# Patient Record
Sex: MALE | Race: WHITE | Employment: OTHER | URBAN - METROPOLITAN AREA
[De-identification: names, ages, dates, MRNs, and addresses within clinical notes are randomized per-mention and may not be internally consistent; named-entity substitution may affect disease eponyms.]

---

## 2017-02-21 ENCOUNTER — TELEPHONE (OUTPATIENT)
Dept: INTERNAL MEDICINE CLINIC | Facility: CLINIC | Age: 77
End: 2017-02-21

## 2017-02-21 DIAGNOSIS — E78.00 HYPERCHOLESTEROLEMIA: Primary | ICD-10-CM

## 2017-02-21 DIAGNOSIS — G60.9 IDIOPATHIC PERIPHERAL NEUROPATHY: Primary | ICD-10-CM

## 2017-02-21 NOTE — TELEPHONE ENCOUNTER
Pt came to to the office to request a referral for a stress test that Dr. Candie Marinelli ordered today. ICD 10 - I25.10, R94.30.             Tasked to Nursing

## 2017-02-21 NOTE — TELEPHONE ENCOUNTER
Pt called today from Dr Jeremy Hewitt office. He was unaware that [facility] at [facility]. Spoke to managed care and they will process referral from Dr DINERO once it has been entered in [facility] [address]. Please kalli it as urgent to managed care.   To bharat

## 2017-02-22 NOTE — TELEPHONE ENCOUNTER
Discussed with patient. He saw Dr. Tena Nguyen yesterday and was given order for of his stress test.  Patient was unclear as to what type of stress test he was to have. I called Dr. Sarah Rae office and spoke to Neto Shell.   She indica

## 2017-02-24 RX ORDER — LORAZEPAM 0.5 MG/1
TABLET ORAL
Qty: 90 TABLET | Refills: 1 | OUTPATIENT
Start: 2017-02-24 | End: 2017-08-11

## 2017-04-24 ENCOUNTER — HOSPITAL ENCOUNTER (OUTPATIENT)
Dept: CV DIAGNOSTICS | Facility: HOSPITAL | Age: 77
Discharge: HOME | End: 2017-04-24
Attending: INTERNAL MEDICINE
Payer: MEDICARE

## 2017-04-24 DIAGNOSIS — R94.30 CARDIOVASCULAR FUNCTION STUDY, ABNORMAL: ICD-10-CM

## 2017-04-24 DIAGNOSIS — I25.10 ASHD (ARTERIOSCLEROTIC HEART DISEASE): ICD-10-CM

## 2017-04-24 PROCEDURE — 93018 CV STRESS TEST I&R ONLY: CPT | Performed by: INTERNAL MEDICINE

## 2017-04-24 PROCEDURE — 93016 CV STRESS TEST SUPVJ ONLY: CPT | Performed by: INTERNAL MEDICINE

## 2017-04-24 PROCEDURE — 93017 CV STRESS TEST TRACING ONLY: CPT

## 2017-06-14 ENCOUNTER — APPOINTMENT (OUTPATIENT)
Dept: LAB | Facility: HOSPITAL | Age: 77
End: 2017-06-14
Attending: INTERNAL MEDICINE
Payer: MEDICARE

## 2017-06-14 DIAGNOSIS — E78.00 HYPERCHOLESTEREMIA: ICD-10-CM

## 2017-06-14 DIAGNOSIS — R74.8 ELEVATED CPK: ICD-10-CM

## 2017-06-14 DIAGNOSIS — R73.01 ABNORMAL FASTING GLUCOSE: ICD-10-CM

## 2017-06-14 PROCEDURE — 84460 ALANINE AMINO (ALT) (SGPT): CPT

## 2017-06-14 PROCEDURE — 82947 ASSAY GLUCOSE BLOOD QUANT: CPT

## 2017-06-14 PROCEDURE — 83036 HEMOGLOBIN GLYCOSYLATED A1C: CPT

## 2017-06-14 PROCEDURE — 82550 ASSAY OF CK (CPK): CPT

## 2017-06-14 PROCEDURE — 84450 TRANSFERASE (AST) (SGOT): CPT

## 2017-06-14 PROCEDURE — 80061 LIPID PANEL: CPT

## 2017-06-14 PROCEDURE — 36415 COLL VENOUS BLD VENIPUNCTURE: CPT

## 2017-06-19 PROBLEM — N52.01 ERECTILE DYSFUNCTION DUE TO ARTERIAL INSUFFICIENCY: Status: ACTIVE | Noted: 2017-06-19

## 2017-06-19 PROBLEM — I70.0 ATHEROSCLEROSIS OF AORTA (HCC): Status: ACTIVE | Noted: 2017-06-19

## 2017-06-19 PROBLEM — R35.1 NOCTURIA: Status: ACTIVE | Noted: 2017-06-19

## 2017-06-21 ENCOUNTER — OFFICE VISIT (OUTPATIENT)
Dept: INTERNAL MEDICINE CLINIC | Facility: CLINIC | Age: 77
End: 2017-06-21

## 2017-06-21 VITALS
DIASTOLIC BLOOD PRESSURE: 70 MMHG | SYSTOLIC BLOOD PRESSURE: 110 MMHG | OXYGEN SATURATION: 97 % | WEIGHT: 181 LBS | BODY MASS INDEX: 24.79 KG/M2 | TEMPERATURE: 98 F | HEIGHT: 71.5 IN | HEART RATE: 80 BPM

## 2017-06-21 DIAGNOSIS — Z00.00 ANNUAL PHYSICAL EXAM: ICD-10-CM

## 2017-06-21 DIAGNOSIS — R97.20 ELEVATED PSA: ICD-10-CM

## 2017-06-21 DIAGNOSIS — M48.061 SPINAL STENOSIS OF LUMBAR REGION: ICD-10-CM

## 2017-06-21 DIAGNOSIS — M15.9 PRIMARY OSTEOARTHRITIS INVOLVING MULTIPLE JOINTS: ICD-10-CM

## 2017-06-21 DIAGNOSIS — R74.8 ELEVATED CPK: ICD-10-CM

## 2017-06-21 DIAGNOSIS — K57.30 DIVERTICULOSIS OF LARGE INTESTINE WITHOUT HEMORRHAGE: ICD-10-CM

## 2017-06-21 DIAGNOSIS — R73.01 ABNORMAL FASTING GLUCOSE: ICD-10-CM

## 2017-06-21 DIAGNOSIS — G62.9 PERIPHERAL POLYNEUROPATHY: ICD-10-CM

## 2017-06-21 DIAGNOSIS — I25.10 ASHD (ARTERIOSCLEROTIC HEART DISEASE): Primary | ICD-10-CM

## 2017-06-21 DIAGNOSIS — N40.0 BENIGN NON-NODULAR PROSTATIC HYPERPLASIA WITHOUT LOWER URINARY TRACT SYMPTOMS: ICD-10-CM

## 2017-06-21 DIAGNOSIS — E78.00 HYPERCHOLESTEREMIA: ICD-10-CM

## 2017-06-21 DIAGNOSIS — Z86.010 HX OF COLONIC POLYP: ICD-10-CM

## 2017-06-21 DIAGNOSIS — I70.0 ATHEROSCLEROSIS OF AORTA (HCC): ICD-10-CM

## 2017-06-21 DIAGNOSIS — R53.83 FATIGUE, UNSPECIFIED TYPE: ICD-10-CM

## 2017-06-21 PROBLEM — N52.01 ERECTILE DYSFUNCTION DUE TO ARTERIAL INSUFFICIENCY: Status: RESOLVED | Noted: 2017-06-19 | Resolved: 2017-06-21

## 2017-06-21 PROBLEM — R35.1 NOCTURIA: Status: RESOLVED | Noted: 2017-06-19 | Resolved: 2017-06-21

## 2017-06-21 PROCEDURE — 99214 OFFICE O/P EST MOD 30 MIN: CPT | Performed by: INTERNAL MEDICINE

## 2017-06-21 PROCEDURE — 90732 PPSV23 VACC 2 YRS+ SUBQ/IM: CPT | Performed by: INTERNAL MEDICINE

## 2017-06-21 PROCEDURE — G0463 HOSPITAL OUTPT CLINIC VISIT: HCPCS | Performed by: INTERNAL MEDICINE

## 2017-06-21 PROCEDURE — G0009 ADMIN PNEUMOCOCCAL VACCINE: HCPCS | Performed by: INTERNAL MEDICINE

## 2017-06-21 NOTE — PROGRESS NOTES
Candy Guerra is a 78year old male. Patient presents with:  Physical: annual phys medicare  Ashd  Hyperlipidemia  Arthritis    HPI:   Patient presents with:  Physical: annual phys medicare  Ashd  Hyperlipidemia  Arthritis    Pt feels well.   Pt continues to cardiovascular function 2001     elevated calcium score    • Allergy    • Spinal stenosis 2005   • Colon, diverticulosis    • Colon polyp    • Back injury 2007   • ASHD (arteriosclerotic heart disease)       Social History:    Smoking Status: Never Smoker Abnormal fasting glucose  Stable. CPM.  Patient's recent . His hemoglobin A1c was 6.0.  CPM.  Patient continues to watch his diet and exercise. 5. Primary osteoarthritis involving multiple joints  Stable.   CPM.    6. Spinal stenosis of lumbar r

## 2017-06-21 NOTE — PATIENT INSTRUCTIONS
1.  Patient is to continue his current diet, medications and activity. 2.  Patient is to consider taking the medication as recommended by Dr. Joella Holstein office to assist in controlling his cholesterol reading.   3.  I will give the patient his Pneumovax vacc

## 2017-08-11 RX ORDER — LORAZEPAM 0.5 MG/1
TABLET ORAL
Qty: 90 TABLET | Refills: 1 | OUTPATIENT
Start: 2017-08-11 | End: 2018-02-07

## 2017-08-11 NOTE — TELEPHONE ENCOUNTER
Noted.  I have approved this patient's medication as requested with 1 additional refill. Please call this prescription into the patient's pharmacy as requested. I will route this to nursing.   Thank you!!

## 2017-09-11 RX ORDER — TAMSULOSIN HYDROCHLORIDE 0.4 MG/1
CAPSULE ORAL
Qty: 90 CAPSULE | Refills: 3 | Status: SHIPPED | OUTPATIENT
Start: 2017-09-11 | End: 2018-12-12

## 2017-11-30 ENCOUNTER — NURSE ONLY (OUTPATIENT)
Dept: INTERNAL MEDICINE CLINIC | Facility: CLINIC | Age: 77
End: 2017-11-30

## 2017-11-30 DIAGNOSIS — J02.9 SORE THROAT: Primary | ICD-10-CM

## 2017-11-30 PROCEDURE — 87880 STREP A ASSAY W/OPTIC: CPT | Performed by: INTERNAL MEDICINE

## 2017-11-30 NOTE — PROGRESS NOTES
Patient presented at the  as a walk in. Reported that his grandson was dx with strep throat. He has been having a sore throat for 3-4 days now. C/o sinus pressure. Denies fever. Per DR. Benz, do rapid strep and if negative send 2nd throat sw

## 2017-12-01 ENCOUNTER — TELEPHONE (OUTPATIENT)
Dept: INTERNAL MEDICINE CLINIC | Facility: CLINIC | Age: 77
End: 2017-12-01

## 2017-12-01 DIAGNOSIS — H40.9 GLAUCOMA OF BOTH EYES, UNSPECIFIED GLAUCOMA TYPE: Primary | ICD-10-CM

## 2017-12-01 DIAGNOSIS — H26.9 CATARACT OF BOTH EYES, UNSPECIFIED CATARACT TYPE: ICD-10-CM

## 2017-12-01 RX ORDER — AZITHROMYCIN 250 MG/1
TABLET, FILM COATED ORAL
Qty: 6 TABLET | Refills: 1 | Status: SHIPPED | OUTPATIENT
Start: 2017-12-01 | End: 2017-12-06

## 2017-12-01 NOTE — TELEPHONE ENCOUNTER
To Dr. Courtney Engle - see below - per Managed Care this can be done with dates of visits entered. See pended referral above - need dx. Review/revise.

## 2017-12-01 NOTE — TELEPHONE ENCOUNTER
Pt. Patti Lang in asking Dr. Comfort Pope To create referrals for his last 2 visits with Dr. Isabell Blount.  He saw him on 11-21-17 for an Eye field test, Glaucoma check and routine exam.  He also saw Dr. Isabell Blount today for a follow-up on eye medication change and cataracts.

## 2017-12-01 NOTE — TELEPHONE ENCOUNTER
Noted.  Discussed with patient. Patient has seen Dr. Eda Herndon for evaluation of his glaucoma and also cataracts. I have approved the patient's referral to see Dr. Eda Herndon for these conditions.   Thank you!!  Patient also stopped by the office to have a throa

## 2017-12-08 ENCOUNTER — LAB ENCOUNTER (OUTPATIENT)
Dept: LAB | Facility: HOSPITAL | Age: 77
End: 2017-12-08
Attending: INTERNAL MEDICINE
Payer: MEDICARE

## 2017-12-08 DIAGNOSIS — R73.01 ABNORMAL FASTING GLUCOSE: ICD-10-CM

## 2017-12-08 DIAGNOSIS — R74.8 ELEVATED CPK: ICD-10-CM

## 2017-12-08 DIAGNOSIS — R97.20 ELEVATED PSA: ICD-10-CM

## 2017-12-08 DIAGNOSIS — E78.00 HYPERCHOLESTEREMIA: ICD-10-CM

## 2017-12-08 DIAGNOSIS — R53.83 FATIGUE, UNSPECIFIED TYPE: ICD-10-CM

## 2017-12-08 DIAGNOSIS — Z00.00 ANNUAL PHYSICAL EXAM: ICD-10-CM

## 2017-12-08 PROCEDURE — 84153 ASSAY OF PSA TOTAL: CPT

## 2017-12-08 PROCEDURE — 85025 COMPLETE CBC W/AUTO DIFF WBC: CPT

## 2017-12-08 PROCEDURE — 80053 COMPREHEN METABOLIC PANEL: CPT

## 2017-12-08 PROCEDURE — 36415 COLL VENOUS BLD VENIPUNCTURE: CPT

## 2017-12-08 PROCEDURE — 84443 ASSAY THYROID STIM HORMONE: CPT

## 2017-12-08 PROCEDURE — 83036 HEMOGLOBIN GLYCOSYLATED A1C: CPT

## 2017-12-08 PROCEDURE — 82550 ASSAY OF CK (CPK): CPT

## 2017-12-08 PROCEDURE — 81001 URINALYSIS AUTO W/SCOPE: CPT

## 2017-12-08 PROCEDURE — 80061 LIPID PANEL: CPT

## 2017-12-15 ENCOUNTER — OFFICE VISIT (OUTPATIENT)
Dept: INTERNAL MEDICINE CLINIC | Facility: CLINIC | Age: 77
End: 2017-12-15

## 2017-12-15 VITALS
TEMPERATURE: 98 F | OXYGEN SATURATION: 97 % | HEIGHT: 71 IN | SYSTOLIC BLOOD PRESSURE: 100 MMHG | DIASTOLIC BLOOD PRESSURE: 66 MMHG | WEIGHT: 191 LBS | BODY MASS INDEX: 26.74 KG/M2 | HEART RATE: 72 BPM

## 2017-12-15 DIAGNOSIS — G62.9 PERIPHERAL POLYNEUROPATHY: ICD-10-CM

## 2017-12-15 DIAGNOSIS — I25.10 ASHD (ARTERIOSCLEROTIC HEART DISEASE): ICD-10-CM

## 2017-12-15 DIAGNOSIS — K57.30 DIVERTICULOSIS OF COLON: ICD-10-CM

## 2017-12-15 DIAGNOSIS — N40.0 BENIGN NON-NODULAR PROSTATIC HYPERPLASIA WITHOUT LOWER URINARY TRACT SYMPTOMS: ICD-10-CM

## 2017-12-15 DIAGNOSIS — I70.0 ATHEROSCLEROSIS OF AORTA (HCC): ICD-10-CM

## 2017-12-15 DIAGNOSIS — Z86.010 HX OF COLONIC POLYP: ICD-10-CM

## 2017-12-15 DIAGNOSIS — R53.83 FATIGUE, UNSPECIFIED TYPE: ICD-10-CM

## 2017-12-15 DIAGNOSIS — M15.9 PRIMARY OSTEOARTHRITIS INVOLVING MULTIPLE JOINTS: ICD-10-CM

## 2017-12-15 DIAGNOSIS — M48.061 SPINAL STENOSIS OF LUMBAR REGION WITHOUT NEUROGENIC CLAUDICATION: ICD-10-CM

## 2017-12-15 DIAGNOSIS — R74.8 ELEVATED CPK: ICD-10-CM

## 2017-12-15 DIAGNOSIS — E78.00 HYPERCHOLESTEREMIA: ICD-10-CM

## 2017-12-15 DIAGNOSIS — R73.01 ABNORMAL FASTING GLUCOSE: ICD-10-CM

## 2017-12-15 DIAGNOSIS — Z00.00 ANNUAL PHYSICAL EXAM: Primary | ICD-10-CM

## 2017-12-15 DIAGNOSIS — R97.20 ELEVATED PSA: ICD-10-CM

## 2017-12-15 PROCEDURE — 82272 OCCULT BLD FECES 1-3 TESTS: CPT | Performed by: INTERNAL MEDICINE

## 2017-12-15 PROCEDURE — 99214 OFFICE O/P EST MOD 30 MIN: CPT | Performed by: INTERNAL MEDICINE

## 2017-12-15 PROCEDURE — G0463 HOSPITAL OUTPT CLINIC VISIT: HCPCS | Performed by: INTERNAL MEDICINE

## 2017-12-15 PROCEDURE — 96160 PT-FOCUSED HLTH RISK ASSMT: CPT | Performed by: INTERNAL MEDICINE

## 2017-12-15 NOTE — PATIENT INSTRUCTIONS
1.  Pt is to continue his current diet, medication and activity. 2.  I will see pt back in 6 months with blood tests which will include an FBS, Hb-A-1-C, Lipid Panel, AST, ALT and diagnostic PSA.   3.  Pt is to follow up with Dr Roger Driscoll regarding his kev

## 2017-12-15 NOTE — PROGRESS NOTES
Aaron Lynch is a 78year old male who presents for a complete physical exam.   HPI:   Mr. Jyotsna Powers is a 17-year-old white male who was seen by me in December 15, 2017 for his Medicare annual physical examination.   At the time the examination Mr. Demi hein daily as needed. Disp:  Rfl:    Ascorbic Acid (VITAMIN C) 1000 MG Oral Tab Take 1 tablet by mouth daily.  Disp:  Rfl:       Past Medical History:   Diagnosis Date   • Abnormal cardiovascular function 2001    elevated calcium score    • Allergy    • ASHD (ar auscultation  CARDIO: RRR, normal S1S2, no murmurs  GI:Abdomen is protuberant, BS are present, no masses or organomegaly  :Normal male, No hernia noted  RECTAL:  Stool is brown and is negative for Occult blood.   Prostate is 1+ enlarged with no palpable n well.  CPM.  Patient's recent . His hemoglobin A1c was 5.9.  CPM.  As above. - GLUCOSE, SERUM; Future  - HEMOGLOBIN A1C; Future    5. Primary osteoarthritis involving multiple joints  Stable. CPM.  Patient is doing well at this time.     6. Spin Yes    Have you had any immunizations at another office such as Influenza, Hepatitis B, Tetanus, or Pneumococcal?: No     Functional Ability     Bathing or Showering: Able without help    Toileting: Able without help    Dressing: Able without help    Eatin sentence and need to ask people to repeat themselves:  Sometimes   I especially have trouble understanding the speech of women and children:  Sometimes I have trouble understanding the speaker in a large room such as at a meeting or place of Yazidism:  No Colonoscopy Screen every 10 years Colonoscopy,10 Years due on 06/22/2022 Update Health Maintenance if applicable    Flex Sigmoidoscopy Screen every 5 years No results found for this or any previous visit. No flowsheet data found.      Fecal Occult Blood Alena Baca GLYCOHEMOGLOBIN (HgA1c) (L) (%)   Date Value   06/22/2016 5.7     Glycohemoglobin (HgA1c) (%)   Date Value   12/08/2017 5.9    No flowsheet data found.     Creat/alb ratio  Annually      LDL  Annually LDL Cholesterol (mg/dL)   Date Value   12/08/2017 132 (H

## 2018-02-08 RX ORDER — LORAZEPAM 0.5 MG/1
TABLET ORAL
Qty: 90 TABLET | Refills: 1 | OUTPATIENT
Start: 2018-02-08 | End: 2018-07-29

## 2018-02-08 NOTE — TELEPHONE ENCOUNTER
To MD:  The above refill request is for a controlled substance. Please indicate yes or no to refill 30 days supply plus one refill. If more refills are appropriate, please indicate quantity  To DR. DINERO

## 2018-02-09 NOTE — TELEPHONE ENCOUNTER
I have approved a refill of this patient's medication with 1 additional refill as requested. Please call in the refill to the patient's pharmacy as requested. I will route this to nursing.   Thank you!!

## 2018-03-08 RX ORDER — COLESEVELAM HYDROCHLORIDE 625 MG/1
TABLET, FILM COATED ORAL
Qty: 180 TABLET | Refills: 3 | Status: SHIPPED | OUTPATIENT
Start: 2018-03-08 | End: 2018-08-23

## 2018-06-04 ENCOUNTER — APPOINTMENT (OUTPATIENT)
Dept: LAB | Facility: HOSPITAL | Age: 78
End: 2018-06-04
Attending: INTERNAL MEDICINE
Payer: MEDICARE

## 2018-06-04 DIAGNOSIS — R97.20 ELEVATED PSA: ICD-10-CM

## 2018-06-04 DIAGNOSIS — R74.8 ELEVATED CPK: ICD-10-CM

## 2018-06-04 DIAGNOSIS — R73.01 ABNORMAL FASTING GLUCOSE: ICD-10-CM

## 2018-06-04 DIAGNOSIS — E78.00 HYPERCHOLESTEREMIA: ICD-10-CM

## 2018-06-04 PROCEDURE — 80061 LIPID PANEL: CPT

## 2018-06-04 PROCEDURE — 82947 ASSAY GLUCOSE BLOOD QUANT: CPT

## 2018-06-04 PROCEDURE — 82550 ASSAY OF CK (CPK): CPT

## 2018-06-04 PROCEDURE — 84153 ASSAY OF PSA TOTAL: CPT

## 2018-06-04 PROCEDURE — 36415 COLL VENOUS BLD VENIPUNCTURE: CPT

## 2018-06-04 PROCEDURE — 84450 TRANSFERASE (AST) (SGOT): CPT

## 2018-06-04 PROCEDURE — 84460 ALANINE AMINO (ALT) (SGPT): CPT

## 2018-06-04 PROCEDURE — 83036 HEMOGLOBIN GLYCOSYLATED A1C: CPT

## 2018-06-06 DIAGNOSIS — R97.20 ELEVATED PSA: ICD-10-CM

## 2018-06-06 DIAGNOSIS — R35.1 NOCTURIA: Primary | ICD-10-CM

## 2018-06-22 ENCOUNTER — OFFICE VISIT (OUTPATIENT)
Dept: INTERNAL MEDICINE CLINIC | Facility: CLINIC | Age: 78
End: 2018-06-22

## 2018-06-22 VITALS
DIASTOLIC BLOOD PRESSURE: 66 MMHG | OXYGEN SATURATION: 98 % | TEMPERATURE: 98 F | WEIGHT: 190.38 LBS | HEIGHT: 71 IN | SYSTOLIC BLOOD PRESSURE: 100 MMHG | HEART RATE: 80 BPM | BODY MASS INDEX: 26.65 KG/M2

## 2018-06-22 DIAGNOSIS — Z00.00 ANNUAL PHYSICAL EXAM: ICD-10-CM

## 2018-06-22 DIAGNOSIS — R97.20 ELEVATED PSA: ICD-10-CM

## 2018-06-22 DIAGNOSIS — K57.30 DIVERTICULOSIS OF COLON: ICD-10-CM

## 2018-06-22 DIAGNOSIS — M15.9 PRIMARY OSTEOARTHRITIS INVOLVING MULTIPLE JOINTS: ICD-10-CM

## 2018-06-22 DIAGNOSIS — Z86.010 HX OF COLONIC POLYP: ICD-10-CM

## 2018-06-22 DIAGNOSIS — R73.01 ABNORMAL FASTING GLUCOSE: ICD-10-CM

## 2018-06-22 DIAGNOSIS — I70.0 ATHEROSCLEROSIS OF AORTA (HCC): ICD-10-CM

## 2018-06-22 DIAGNOSIS — R53.83 FATIGUE, UNSPECIFIED TYPE: ICD-10-CM

## 2018-06-22 DIAGNOSIS — E78.00 HYPERCHOLESTEREMIA: ICD-10-CM

## 2018-06-22 DIAGNOSIS — G62.9 PERIPHERAL POLYNEUROPATHY: ICD-10-CM

## 2018-06-22 DIAGNOSIS — R74.8 ELEVATED CPK: ICD-10-CM

## 2018-06-22 DIAGNOSIS — I25.10 ASHD (ARTERIOSCLEROTIC HEART DISEASE): Primary | ICD-10-CM

## 2018-06-22 DIAGNOSIS — M48.061 SPINAL STENOSIS OF LUMBAR REGION WITHOUT NEUROGENIC CLAUDICATION: ICD-10-CM

## 2018-06-22 DIAGNOSIS — N40.0 BENIGN NON-NODULAR PROSTATIC HYPERPLASIA WITHOUT LOWER URINARY TRACT SYMPTOMS: ICD-10-CM

## 2018-06-22 PROCEDURE — 99214 OFFICE O/P EST MOD 30 MIN: CPT | Performed by: INTERNAL MEDICINE

## 2018-06-22 PROCEDURE — G0463 HOSPITAL OUTPT CLINIC VISIT: HCPCS | Performed by: INTERNAL MEDICINE

## 2018-06-22 RX ORDER — AZELASTINE HCL 205.5 UG/1
SPRAY NASAL
Qty: 30 ML | Refills: 3 | Status: SHIPPED | OUTPATIENT
Start: 2018-06-22

## 2018-06-22 NOTE — PATIENT INSTRUCTIONS
1.  Patient is to continue his current diet, medication and activity. 2.  I will plan see the patient back in 5-6 months with blood tests, urinalysis and EKG for his annual physical examination. 3.  The patient back sooner as necessary.

## 2018-06-22 NOTE — PROGRESS NOTES
Candy Guerra is a 78year old male. Patient presents with:  Checkup: 6 month  Ashd  Hyperlipidemia  Arthritis    HPI:   Patient presents with:  Checkup: 6 month  Ashd  Hyperlipidemia  Arthritis    Pt feels well. Pt is now taking Crestor twice a week.   Pt h injury 2007   • Colon polyp    • Colon, diverticulosis    • Spinal stenosis 2005      Social History:  Smoking status: Never Smoker                                                              Smokeless tobacco: Never Used                      Alcohol use: Patient's recent . His hemoglobin A1c was 5.9.    4. Primary osteoarthritis involving multiple joints  Stable. CPM.    5. Spinal stenosis of lumbar region without neurogenic claudication  Stable. CPM.    6. Peripheral polyneuropathy  Stable.   CPM

## 2018-07-06 ENCOUNTER — LAB ENCOUNTER (OUTPATIENT)
Dept: LAB | Facility: HOSPITAL | Age: 78
End: 2018-07-06
Attending: UROLOGY
Payer: MEDICARE

## 2018-07-06 DIAGNOSIS — R35.1 NOCTURIA: ICD-10-CM

## 2018-07-06 DIAGNOSIS — R97.20 ELEVATED PSA: ICD-10-CM

## 2018-07-06 LAB
BACTERIA UR QL AUTO: NEGATIVE /HPF
BILIRUB UR QL: NEGATIVE
CLARITY UR: CLEAR
COLOR UR: YELLOW
GLUCOSE UR-MCNC: NEGATIVE MG/DL
KETONES UR-MCNC: NEGATIVE MG/DL
LEUKOCYTE ESTERASE UR QL STRIP.AUTO: NEGATIVE
NITRITE UR QL STRIP.AUTO: NEGATIVE
PH UR: 6 [PH] (ref 5–8)
PROT UR-MCNC: NEGATIVE MG/DL
PSA FREE MFR SERPL: 37 %
PSA FREE SERPL-MCNC: 3.6 NG/ML
PSA SERPL-MCNC: 9.7 NG/ML (ref 0–4)
RBC #/AREA URNS AUTO: 0 /HPF
SP GR UR STRIP: 1.01 (ref 1–1.03)
UROBILINOGEN UR STRIP-ACNC: <2
VIT C UR-MCNC: NEGATIVE MG/DL
WBC #/AREA URNS AUTO: 0 /HPF

## 2018-07-06 PROCEDURE — 84154 ASSAY OF PSA FREE: CPT

## 2018-07-06 PROCEDURE — 81001 URINALYSIS AUTO W/SCOPE: CPT

## 2018-07-06 PROCEDURE — 84153 ASSAY OF PSA TOTAL: CPT

## 2018-07-06 PROCEDURE — 36415 COLL VENOUS BLD VENIPUNCTURE: CPT

## 2018-07-16 ENCOUNTER — OFFICE VISIT (OUTPATIENT)
Dept: SURGERY | Facility: CLINIC | Age: 78
End: 2018-07-16

## 2018-07-16 VITALS
DIASTOLIC BLOOD PRESSURE: 70 MMHG | WEIGHT: 190 LBS | TEMPERATURE: 98 F | HEART RATE: 88 BPM | HEIGHT: 71 IN | BODY MASS INDEX: 26.6 KG/M2 | RESPIRATION RATE: 16 BRPM | SYSTOLIC BLOOD PRESSURE: 125 MMHG

## 2018-07-16 DIAGNOSIS — R97.20 ELEVATED PSA: Primary | ICD-10-CM

## 2018-07-16 DIAGNOSIS — N48.6 PEYRONIE'S DISEASE: ICD-10-CM

## 2018-07-16 DIAGNOSIS — N52.01 ERECTILE DYSFUNCTION DUE TO ARTERIAL INSUFFICIENCY: ICD-10-CM

## 2018-07-16 DIAGNOSIS — N40.1 BENIGN NON-NODULAR PROSTATIC HYPERPLASIA WITH LOWER URINARY TRACT SYMPTOMS: ICD-10-CM

## 2018-07-16 DIAGNOSIS — R35.1 NOCTURIA: ICD-10-CM

## 2018-07-16 PROCEDURE — G0463 HOSPITAL OUTPT CLINIC VISIT: HCPCS | Performed by: UROLOGY

## 2018-07-16 PROCEDURE — 99215 OFFICE O/P EST HI 40 MIN: CPT | Performed by: UROLOGY

## 2018-07-16 PROCEDURE — 99212 OFFICE O/P EST SF 10 MIN: CPT | Performed by: UROLOGY

## 2018-07-16 RX ORDER — SILDENAFIL CITRATE 20 MG/1
TABLET ORAL
Qty: 25 TABLET | Refills: 5 | Status: SHIPPED | OUTPATIENT
Start: 2018-07-16 | End: 2019-06-26 | Stop reason: ALTCHOICE

## 2018-07-16 RX ORDER — FINASTERIDE 5 MG/1
5 TABLET, FILM COATED ORAL DAILY
Qty: 90 TABLET | Refills: 3 | Status: SHIPPED | OUTPATIENT
Start: 2018-07-16 | End: 2019-07-13

## 2018-07-16 RX ORDER — TAMSULOSIN HYDROCHLORIDE 0.4 MG/1
0.4 CAPSULE ORAL DAILY
Qty: 90 CAPSULE | Refills: 3 | Status: SHIPPED | OUTPATIENT
Start: 2018-07-16 | End: 2019-09-03

## 2018-07-16 NOTE — PATIENT INSTRUCTIONS
1.     Continue tamsulosin 0.4 mg daily    2. Start finasteride 5 mg daily--to inhibit size of the prostate    3. Visit in end of December 2018 or beginning of January 2019.     PSA total and free December 2018; please avoid all sexual stimulation for 5

## 2018-07-16 NOTE — PROGRESS NOTES
HPI:    Patient ID: Mikey Gonzalez is a 78year old male. HPI     1. Elevated PSA  Denies associated symptoms to suggest prostate cancer such as bone pain. The patient denies any weight loss or bone pain.    He feels that he does not have a prostate infec He denies any pain. He is still able to have intercourse with his wife. Review of previous records:   Visit note on 12/02/14:  Dr. Walter Elizabeth notes remark that patient will be referred to me for evaluation of elevated PSA.  Patient previously was seein 1/2 hour following the same meal each day Disp: 90 capsule Rfl: 3   Sildenafil Citrate 20 MG Oral Tab Take 2--3 tablets orally 1-2 hours before planned sexual activity daily.  Disp: 25 tablet Rfl: 5   Azelastine HCl 0.15 % Nasal Solution SPRAY ONE SPRAY IN ELECTROCARDIOGRAM, COMPLETE      Comment: Scanned to media tab - DOS 11-  2007: OTHER SURGICAL HISTORY      Comment: Laminoforaminotomy   Family History   Problem Relation Age of Onset   • Stroke Father    • Cancer Mother      brain tumor      Socia answering questions about treatment and coordinating care.        ASSESSMENT/PLAN:   (R97.20) Elevated PSA  (primary encounter diagnosis)  07/06/2018 PSA = 9.7; Free PSA = 37 (probability of cancer 8%) compared to 06/04/2018 PSA = 8.1 though in 11/3/2016 PS patient understands all of this and wants to start medication. I advised this should not be taken within 4 hours of tamsulosin.     (N48.6) Peyronie's disease  Patient fees this is stable. He has not tried any treatment for this problem.  I discussed, expl 1/2 hour following the same meal each day      Sildenafil Citrate 20 MG Oral Tab 25 tablet 5      Sig: Take 2--3 tablets orally 1-2 hours before planned sexual activity daily.            Imaging & Referrals:  None     ID#[id number]    By signing my name below, I,

## 2018-07-29 ENCOUNTER — TELEPHONE (OUTPATIENT)
Dept: INTERNAL MEDICINE CLINIC | Facility: CLINIC | Age: 78
End: 2018-07-29

## 2018-07-29 RX ORDER — LORAZEPAM 0.5 MG/1
0.5 TABLET ORAL NIGHTLY
Qty: 90 TABLET | Refills: 1 | OUTPATIENT
Start: 2018-07-29 | End: 2018-07-30 | Stop reason: RX

## 2018-07-29 NOTE — TELEPHONE ENCOUNTER
Noted.   I have approved this prescription refill with one additional refill. Please call this refill in to pt's pharmacy as requested. I will route this to nursing.   Thank you!!

## 2018-07-30 RX ORDER — LORAZEPAM 1 MG/1
TABLET ORAL
Qty: 45 TABLET | Refills: 1 | COMMUNITY
Start: 2018-07-30 | End: 2018-12-12

## 2018-07-30 NOTE — TELEPHONE ENCOUNTER
Note rec'd from [facility] regarding Lorazepam.  \"Lorazepam 0.5MG is currently on backorder.   Please provide alternate\"  Form placed in purple folder  Tasked to RX

## 2018-07-30 NOTE — TELEPHONE ENCOUNTER
called pharmacy - called in RX for 1mg tablets with instructions to take half a tablet  Nightly as needed # 45 with 1 refill

## 2018-08-16 ENCOUNTER — APPOINTMENT (OUTPATIENT)
Dept: LAB | Age: 78
End: 2018-08-16
Attending: INTERNAL MEDICINE
Payer: MEDICARE

## 2018-08-16 ENCOUNTER — OFFICE VISIT (OUTPATIENT)
Dept: INTERNAL MEDICINE CLINIC | Facility: CLINIC | Age: 78
End: 2018-08-16
Payer: MEDICARE

## 2018-08-16 VITALS
TEMPERATURE: 99 F | DIASTOLIC BLOOD PRESSURE: 66 MMHG | SYSTOLIC BLOOD PRESSURE: 106 MMHG | BODY MASS INDEX: 26.6 KG/M2 | HEIGHT: 71 IN | HEART RATE: 76 BPM | WEIGHT: 190 LBS | OXYGEN SATURATION: 97 %

## 2018-08-16 DIAGNOSIS — L08.9 INFECTED SLIVER OF SKIN OF FINGER, INITIAL ENCOUNTER: Primary | ICD-10-CM

## 2018-08-16 DIAGNOSIS — N39.0 URINARY TRACT INFECTION WITHOUT HEMATURIA, SITE UNSPECIFIED: ICD-10-CM

## 2018-08-16 DIAGNOSIS — S60.459A INFECTED SLIVER OF SKIN OF FINGER, INITIAL ENCOUNTER: Primary | ICD-10-CM

## 2018-08-16 LAB
BACTERIA UR QL AUTO: NEGATIVE /HPF
BILIRUB UR QL: NEGATIVE
COLOR UR: YELLOW
GLUCOSE UR-MCNC: NEGATIVE MG/DL
HGB UR QL STRIP.AUTO: NEGATIVE
KETONES UR-MCNC: NEGATIVE MG/DL
LEUKOCYTE ESTERASE UR QL STRIP.AUTO: NEGATIVE
NITRITE UR QL STRIP.AUTO: NEGATIVE
PH UR: 5 [PH] (ref 5–8)
PROT UR-MCNC: NEGATIVE MG/DL
RBC #/AREA URNS AUTO: 1 /HPF
SP GR UR STRIP: 1.03 (ref 1–1.03)
UROBILINOGEN UR STRIP-ACNC: <2
VIT C UR-MCNC: NEGATIVE MG/DL
WBC #/AREA URNS AUTO: 3 /HPF

## 2018-08-16 PROCEDURE — 81001 URINALYSIS AUTO W/SCOPE: CPT

## 2018-08-16 PROCEDURE — 87086 URINE CULTURE/COLONY COUNT: CPT

## 2018-08-16 PROCEDURE — 99212 OFFICE O/P EST SF 10 MIN: CPT | Performed by: INTERNAL MEDICINE

## 2018-08-16 PROCEDURE — 99214 OFFICE O/P EST MOD 30 MIN: CPT | Performed by: INTERNAL MEDICINE

## 2018-08-16 NOTE — PATIENT INSTRUCTIONS
1.  Patient is to continue his current diet, medication and activity. 2.  Patient is to wash his hands with warm soapy water 3 or 4 times a day. 3.  Patient to soak the #2 digit of his left hand in warm soapy water 3 or 4 times a day.   4.  Patient to enrike

## 2018-08-16 NOTE — PROGRESS NOTES
Susanne Gonzalez is a 78year old male. Patient presents with:  FB in Skin (integumentary): c/o a piece of tall grass splinter to second finger left hand. reports tenderness. onset: 8/6/18.     HPI:   Patient presents with:  FB in Skin (integumentary): c/o a pie daily.   Disp:  Rfl:    Coenzyme Q10 (COQ-10) 100 MG Oral Cap Take 1 capsule by mouth daily. Disp:  Rfl:    omega-3 fatty acids (FISH OIL) 1000 MG Oral Cap Take 1 capsule by mouth daily.  Disp:  Rfl:    ibuprofen (ADVIL) 200 MG Oral Tab Take 1 tablet by m Black material simply removed on 2 occasions after this there is no more material to be removed. The area appeared clean and appear to be healing well. NEURO: alert and oriented  ASSESSMENT AND PLAN:   There are no diagnoses linked to this encounter.

## 2018-08-19 ENCOUNTER — TELEPHONE (OUTPATIENT)
Dept: INTERNAL MEDICINE CLINIC | Facility: CLINIC | Age: 78
End: 2018-08-19

## 2018-08-19 NOTE — TELEPHONE ENCOUNTER
Please call pt and notify him that his recent U/A and Urine C+S have turned out well. No UTI noted. I will route this to nursing.   Thank you!!

## 2018-08-23 RX ORDER — COLESEVELAM HYDROCHLORIDE 625 MG/1
TABLET, FILM COATED ORAL
Qty: 180 TABLET | Refills: 3 | Status: SHIPPED | OUTPATIENT
Start: 2018-08-23 | End: 2019-02-27

## 2018-09-02 RX ORDER — TAMSULOSIN HYDROCHLORIDE 0.4 MG/1
CAPSULE ORAL
Qty: 90 CAPSULE | Refills: 3 | Status: SHIPPED | OUTPATIENT
Start: 2018-09-02 | End: 2020-02-06

## 2018-09-14 ENCOUNTER — TELEPHONE (OUTPATIENT)
Dept: INTERNAL MEDICINE CLINIC | Facility: CLINIC | Age: 78
End: 2018-09-14

## 2018-09-14 NOTE — TELEPHONE ENCOUNTER
Yi Farah / [employer] calling pt has mohinder on 9/17 please fax last office visit notes & latest labs  Fax # [phone]   Tasked to nursing

## 2018-12-04 ENCOUNTER — LAB ENCOUNTER (OUTPATIENT)
Dept: LAB | Facility: HOSPITAL | Age: 78
End: 2018-12-04
Attending: INTERNAL MEDICINE
Payer: MEDICARE

## 2018-12-04 DIAGNOSIS — Z00.00 ANNUAL PHYSICAL EXAM: ICD-10-CM

## 2018-12-04 DIAGNOSIS — R53.83 FATIGUE, UNSPECIFIED TYPE: ICD-10-CM

## 2018-12-04 DIAGNOSIS — E78.00 HYPERCHOLESTEREMIA: ICD-10-CM

## 2018-12-04 DIAGNOSIS — R74.8 ELEVATED CPK: ICD-10-CM

## 2018-12-04 DIAGNOSIS — R97.20 ELEVATED PSA: ICD-10-CM

## 2018-12-04 DIAGNOSIS — R73.01 ABNORMAL FASTING GLUCOSE: ICD-10-CM

## 2018-12-04 DIAGNOSIS — E78.00 PURE HYPERCHOLESTEROLEMIA: Primary | ICD-10-CM

## 2018-12-04 PROCEDURE — 80053 COMPREHEN METABOLIC PANEL: CPT

## 2018-12-04 PROCEDURE — 82550 ASSAY OF CK (CPK): CPT

## 2018-12-04 PROCEDURE — 80061 LIPID PANEL: CPT

## 2018-12-04 PROCEDURE — 81001 URINALYSIS AUTO W/SCOPE: CPT

## 2018-12-04 PROCEDURE — 85025 COMPLETE CBC W/AUTO DIFF WBC: CPT

## 2018-12-04 PROCEDURE — 84153 ASSAY OF PSA TOTAL: CPT

## 2018-12-04 PROCEDURE — 36415 COLL VENOUS BLD VENIPUNCTURE: CPT

## 2018-12-04 PROCEDURE — 83036 HEMOGLOBIN GLYCOSYLATED A1C: CPT

## 2018-12-04 PROCEDURE — 84154 ASSAY OF PSA FREE: CPT

## 2018-12-04 PROCEDURE — 84443 ASSAY THYROID STIM HORMONE: CPT

## 2018-12-12 ENCOUNTER — OFFICE VISIT (OUTPATIENT)
Dept: INTERNAL MEDICINE CLINIC | Facility: CLINIC | Age: 78
End: 2018-12-12
Payer: MEDICARE

## 2018-12-12 VITALS
TEMPERATURE: 98 F | DIASTOLIC BLOOD PRESSURE: 70 MMHG | HEIGHT: 71 IN | HEART RATE: 68 BPM | BODY MASS INDEX: 26.67 KG/M2 | OXYGEN SATURATION: 97 % | SYSTOLIC BLOOD PRESSURE: 104 MMHG | WEIGHT: 190.5 LBS

## 2018-12-12 DIAGNOSIS — R53.83 FATIGUE, UNSPECIFIED TYPE: ICD-10-CM

## 2018-12-12 DIAGNOSIS — N40.0 BENIGN NON-NODULAR PROSTATIC HYPERPLASIA WITHOUT LOWER URINARY TRACT SYMPTOMS: ICD-10-CM

## 2018-12-12 DIAGNOSIS — I70.0 ATHEROSCLEROSIS OF AORTA (HCC): ICD-10-CM

## 2018-12-12 DIAGNOSIS — R74.8 ELEVATED CPK: ICD-10-CM

## 2018-12-12 DIAGNOSIS — M48.061 SPINAL STENOSIS OF LUMBAR REGION WITHOUT NEUROGENIC CLAUDICATION: ICD-10-CM

## 2018-12-12 DIAGNOSIS — R97.20 ELEVATED PSA: ICD-10-CM

## 2018-12-12 DIAGNOSIS — I25.10 ASHD (ARTERIOSCLEROTIC HEART DISEASE): ICD-10-CM

## 2018-12-12 DIAGNOSIS — Z86.010 HISTORY OF ADENOMATOUS POLYP OF COLON: ICD-10-CM

## 2018-12-12 DIAGNOSIS — R73.01 ABNORMAL FASTING GLUCOSE: ICD-10-CM

## 2018-12-12 DIAGNOSIS — K57.30 DIVERTICULOSIS OF COLON: ICD-10-CM

## 2018-12-12 DIAGNOSIS — E78.00 HYPERCHOLESTEREMIA: ICD-10-CM

## 2018-12-12 DIAGNOSIS — Z00.00 ANNUAL PHYSICAL EXAM: Primary | ICD-10-CM

## 2018-12-12 DIAGNOSIS — M15.9 PRIMARY OSTEOARTHRITIS INVOLVING MULTIPLE JOINTS: ICD-10-CM

## 2018-12-12 DIAGNOSIS — G62.9 PERIPHERAL POLYNEUROPATHY: ICD-10-CM

## 2018-12-12 PROCEDURE — 82272 OCCULT BLD FECES 1-3 TESTS: CPT | Performed by: INTERNAL MEDICINE

## 2018-12-12 PROCEDURE — G0439 PPPS, SUBSEQ VISIT: HCPCS | Performed by: INTERNAL MEDICINE

## 2018-12-12 PROCEDURE — 96160 PT-FOCUSED HLTH RISK ASSMT: CPT | Performed by: INTERNAL MEDICINE

## 2018-12-12 RX ORDER — ROSUVASTATIN CALCIUM 10 MG/1
1 TABLET, COATED ORAL
Refills: 1 | COMMUNITY
Start: 2018-11-02 | End: 2019-01-16

## 2018-12-12 RX ORDER — FLUTICASONE PROPIONATE 50 MCG
SPRAY, SUSPENSION (ML) NASAL
COMMUNITY
End: 2019-01-16

## 2018-12-12 RX ORDER — LORAZEPAM 0.5 MG/1
0.5 TABLET ORAL NIGHTLY PRN
COMMUNITY
End: 2019-03-17

## 2018-12-12 RX ORDER — BIMATOPROST 0.01 %
1 DROPS OPHTHALMIC (EYE) DAILY
Refills: 3 | COMMUNITY
Start: 2018-11-09 | End: 2021-11-18

## 2018-12-12 RX ORDER — CETIRIZINE HYDROCHLORIDE 10 MG/1
10 TABLET ORAL NIGHTLY PRN
COMMUNITY
End: 2021-11-18

## 2018-12-12 RX ORDER — ALPHA LIPOIC ACID 300 MG
1 CAPSULE ORAL DAILY
COMMUNITY
Start: 2017-01-01

## 2018-12-12 NOTE — PATIENT INSTRUCTIONS
1.  Patient is to continue his current diet, medication and activity. 2.  Patient to follow-up with Dr. Paulina Camacho and Dr Rafia Tomlinson as he is scheduled to do. 3.  Patient is also to follow-up with Dr. Barbara Mena has he is doing.   4.  I will plan to see the p

## 2018-12-13 ENCOUNTER — OFFICE VISIT (OUTPATIENT)
Dept: NEUROLOGY | Facility: CLINIC | Age: 78
End: 2018-12-13
Payer: MEDICARE

## 2018-12-13 ENCOUNTER — TELEPHONE (OUTPATIENT)
Dept: NEUROLOGY | Facility: CLINIC | Age: 78
End: 2018-12-13

## 2018-12-13 VITALS
SYSTOLIC BLOOD PRESSURE: 112 MMHG | BODY MASS INDEX: 26.6 KG/M2 | RESPIRATION RATE: 16 BRPM | WEIGHT: 190 LBS | DIASTOLIC BLOOD PRESSURE: 77 MMHG | HEIGHT: 71 IN | HEART RATE: 64 BPM

## 2018-12-13 DIAGNOSIS — R51.9 HEADACHE DISORDER: Primary | ICD-10-CM

## 2018-12-13 PROCEDURE — 99203 OFFICE O/P NEW LOW 30 MIN: CPT | Performed by: OTHER

## 2018-12-13 NOTE — TELEPHONE ENCOUNTER
[address] [facility] for authorization of approval of MRI brain w/wo cpt code 81030. Talked to Kirill HERRING     who initiated request. Approved . Tracking # [id number]. Will obtain authorization number once [facility] updates facility [facility].

## 2018-12-13 NOTE — PROGRESS NOTES
Mr Vega Joseph was referred by Dr. Gladis Kauffman.  I reviewed epic records. He relates every 6 months visual distortion of shimmering triangles which starts in the left upper quadrant and then goes to the right lower quadrant.   He is able to visualize these even with Cholecalciferol (VITAMIN D-3) 5000 units Oral Tab Take 1 tablet by mouth daily. Disp:  Rfl:    Alpha-Lipoic Acid 300 MG Oral Cap Take 1 tablet by mouth daily. Disp:  Rfl:    VITAMIN B COMPLEX-C OR Take 1 tablet by mouth daily.  Disp:  Rfl:    LUMIGAN 0.01 as needed. Disp:  Rfl:    Ascorbic Acid (VITAMIN C) 1000 MG Oral Tab Take 1 tablet by mouth daily.  Disp:  Rfl:       Past Medical History:   Diagnosis Date   • Abnormal cardiovascular function 2001    elevated calcium score    • Allergy    • ASHD (arterios throat; hearing loss negative  RESPIRATORY: denies shortness of breath, wheezing or cough   CARDIOVASCULAR: denies chest pain or BENJAMIN; no palpitations   GI: denies nausea, vomiting, constipation, diarrhea; no heartburn  GENITAL/: no dysuria, urgency or fr blocker. I greatly appreciate the opportunity of participating in his neurological care. No orders of the defined types were placed in this encounter.     MRI BRAIN (W+WO) (CPT=70553)      LORazepam 0.5 MG Oral Tab Take 0.5 mg by mouth nightly as need Rfl:    loratadine 10 MG Oral Tab Take 10 mg by mouth daily as needed for Allergies. Disp:  Rfl:    Tadalafil (CIALIS) 20 MG Oral Tab Use as directed.   I recommend at least a 4 hour interval between taking Cialis and tamsulosin Disp: 36 tablet Rfl: 0   asp

## 2019-01-09 ENCOUNTER — LAB ENCOUNTER (OUTPATIENT)
Dept: LAB | Facility: HOSPITAL | Age: 79
End: 2019-01-09
Attending: Other
Payer: MEDICARE

## 2019-01-09 ENCOUNTER — HOSPITAL ENCOUNTER (OUTPATIENT)
Dept: MRI IMAGING | Facility: HOSPITAL | Age: 79
Discharge: HOME | End: 2019-01-09
Attending: Other
Payer: MEDICARE

## 2019-01-09 DIAGNOSIS — R94.5 NONSPECIFIC ABNORMAL RESULTS OF LIVER FUNCTION STUDY: ICD-10-CM

## 2019-01-09 DIAGNOSIS — R51.9 HEADACHE DISORDER: ICD-10-CM

## 2019-01-09 DIAGNOSIS — E78.00 PURE HYPERCHOLESTEROLEMIA: Primary | ICD-10-CM

## 2019-01-09 LAB — ERYTHROCYTE [SEDIMENTATION RATE] IN BLOOD: 13 MM/HR (ref 0–20)

## 2019-01-09 PROCEDURE — 85652 RBC SED RATE AUTOMATED: CPT

## 2019-01-09 PROCEDURE — 36415 COLL VENOUS BLD VENIPUNCTURE: CPT

## 2019-01-09 PROCEDURE — A9575 INJ GADOTERATE MEGLUMI 0.1ML: HCPCS | Performed by: OTHER

## 2019-01-09 PROCEDURE — 82085 ASSAY OF ALDOLASE: CPT

## 2019-01-09 PROCEDURE — 70553 MRI BRAIN STEM W/O & W/DYE: CPT | Performed by: OTHER

## 2019-01-12 LAB — ALDOLASE, SERUM: 5 U/L

## 2019-01-16 ENCOUNTER — OFFICE VISIT (OUTPATIENT)
Dept: INTERNAL MEDICINE CLINIC | Facility: CLINIC | Age: 79
End: 2019-01-16
Payer: MEDICARE

## 2019-01-16 VITALS
HEART RATE: 68 BPM | DIASTOLIC BLOOD PRESSURE: 70 MMHG | BODY MASS INDEX: 27 KG/M2 | WEIGHT: 191 LBS | TEMPERATURE: 98 F | OXYGEN SATURATION: 97 % | SYSTOLIC BLOOD PRESSURE: 120 MMHG

## 2019-01-16 DIAGNOSIS — M54.16 LUMBAR RADICULOPATHY: ICD-10-CM

## 2019-01-16 DIAGNOSIS — M15.9 PRIMARY OSTEOARTHRITIS INVOLVING MULTIPLE JOINTS: ICD-10-CM

## 2019-01-16 DIAGNOSIS — M79.604 RIGHT LEG PAIN: Primary | ICD-10-CM

## 2019-01-16 DIAGNOSIS — G62.9 PERIPHERAL POLYNEUROPATHY: ICD-10-CM

## 2019-01-16 DIAGNOSIS — M48.061 SPINAL STENOSIS OF LUMBAR REGION WITHOUT NEUROGENIC CLAUDICATION: ICD-10-CM

## 2019-01-16 PROCEDURE — 99214 OFFICE O/P EST MOD 30 MIN: CPT | Performed by: INTERNAL MEDICINE

## 2019-01-16 PROCEDURE — G0463 HOSPITAL OUTPT CLINIC VISIT: HCPCS | Performed by: INTERNAL MEDICINE

## 2019-01-16 NOTE — PROGRESS NOTES
Surjit Anguiano is a 70year old male. Patient presents with:  Leg Pain: 4-6 wks    HPI:   Patient presents with:  Leg Pain: 4-6 wks    Since patient was last year he developed the pain that comes on his right mid calf \"like a band around the leg\" that he is TAMSULOSIN HCL 0.4 MG Oral Cap TAKE 1 CAPSULE(0.4 MG) BY MOUTH DAILY Disp: 90 capsule Rfl: 3   WELCHOL 625 MG Oral Tab TAKE 3 TABLETS(1875 MG) BY MOUTH TWICE DAILY WITH MEALS Disp: 180 tablet Rfl: 3   finasteride 5 MG Oral Tab Take 1 tablet (5 mg total) SOB  CARDIOVASCULAR: No chest pain  GI: No abdominal pain, nausea, vomiting, diarrhea, or constipation  :No Urinary complaints  EXT:No complaints of pain or swelling in patient's legs    EXAM:   /70 (BP Location: Right arm, Patient Position: Sittin 2 tablets 3 times a day with 2 of the pills being given at bedtime. He is to do this for 10-14 days and then taper off and use the ibuprofen as necessary. I will see the patient back in 1 month.     The patient indicates understanding of these issues and

## 2019-01-16 NOTE — PATIENT INSTRUCTIONS
1.  Patient is to continue his current diet, medication and activity. 2.  Patient may take ibuprofen 2 tablets 3 times a day which will include taking 2 tablets prior to bedtime.   He is to do this for about 10-14 days and then taper off and use as necessa

## 2019-01-23 ENCOUNTER — OFFICE VISIT (OUTPATIENT)
Dept: SURGERY | Facility: CLINIC | Age: 79
End: 2019-01-23
Payer: MEDICARE

## 2019-01-23 VITALS
HEART RATE: 74 BPM | BODY MASS INDEX: 26.74 KG/M2 | SYSTOLIC BLOOD PRESSURE: 114 MMHG | WEIGHT: 191 LBS | HEIGHT: 71 IN | DIASTOLIC BLOOD PRESSURE: 72 MMHG

## 2019-01-23 DIAGNOSIS — R97.20 ELEVATED PSA: Primary | ICD-10-CM

## 2019-01-23 DIAGNOSIS — N48.6 PEYRONIE'S DISEASE: ICD-10-CM

## 2019-01-23 DIAGNOSIS — R35.1 NOCTURIA: ICD-10-CM

## 2019-01-23 DIAGNOSIS — N52.01 ERECTILE DYSFUNCTION DUE TO ARTERIAL INSUFFICIENCY: ICD-10-CM

## 2019-01-23 DIAGNOSIS — N40.1 BENIGN NON-NODULAR PROSTATIC HYPERPLASIA WITH LOWER URINARY TRACT SYMPTOMS: ICD-10-CM

## 2019-01-23 PROCEDURE — G0463 HOSPITAL OUTPT CLINIC VISIT: HCPCS | Performed by: UROLOGY

## 2019-01-23 PROCEDURE — 99215 OFFICE O/P EST HI 40 MIN: CPT | Performed by: UROLOGY

## 2019-01-23 NOTE — PATIENT INSTRUCTIONS
Arcelia Presley M.D.      1.. For your erectile dysfunction, increase tadalafil/Cialis 7.5 mg tablet--increase to 2 tablets at least                     2 hours if not more before planned sexual activity      2.    Continue aggarwal

## 2019-01-23 NOTE — PROGRESS NOTES
HPI:    Patient ID: Surjit Anguiano is a 70year old male. HPI     1. Elevated PSA  Denies associated symptoms to suggest prostate cancer such as bone pain. The patient denies any weight loss or bone pain.    He feels that he does not have a prostate infec active though when he sits for prolonged amounts of time he notices slower stream.     5. Peyronie's Disease  The patient states the penis has been curving to the left side during erections since 2016; at most a 30 degree curvature. He denies any pain.   H Positive for urinary frequency less than 2 hours, intermittent stream, difficulty postponing urination, weak stream, and nocturia 2x   Skin: Negative for color change. Neurological: Negative for speech difficulty.    Psychiatric/Behavioral: The patient is Tadalafil (CIALIS) 20 MG Oral Tab Use as directed. I recommend at least a 4 hour interval between taking Cialis and tamsulosin Disp: 36 tablet Rfl: 0   aspirin 81 MG Oral Tab Take 81 mg by mouth daily.  Disp:  Rfl:    Coenzyme Q10 (COQ-10) 100 MG Oral Ca again less than 2 hours after you finished urinating?: Less than half the time  Over the past month, how often have you found that you stopped and started again several times when you urinated?: Less than half the time  Over the past month, how often have 0  06/04/2018 PSA = 8.1  12/08/2017 PSA = 8.7  12/14/2016 PSA = 5.4  11/3/2016 urine blood = negative, microscopic not indicated  11/3/2016 PSA = 15.0  12/23/2015 PSA = 5.0  10/21/2015 PSA = 6.4  11/07/2013 PSA = 3.9  11/11/2011 PSA = 5.8  09/23/2009 PSA = libido. I recommend that he take two tablets 2 or more hours before sexual activity and patient agrees. I also discussed possibility of starting intra cavernosal injection therapy which he declined.  He should avoid taking this medication within 4 hours of Free      Meds This Visit:  Requested Prescriptions      No prescriptions requested or ordered in this encounter       Imaging & Referrals:  None     ID#[id number]    By signing my name below, I, Trista Jackson,  attest that this documentation has been prepa

## 2019-02-27 ENCOUNTER — OFFICE VISIT (OUTPATIENT)
Dept: NEUROLOGY | Facility: CLINIC | Age: 79
End: 2019-02-27
Payer: MEDICARE

## 2019-02-27 VITALS
DIASTOLIC BLOOD PRESSURE: 70 MMHG | BODY MASS INDEX: 26.6 KG/M2 | RESPIRATION RATE: 16 BRPM | WEIGHT: 190 LBS | SYSTOLIC BLOOD PRESSURE: 120 MMHG | HEIGHT: 71 IN | HEART RATE: 76 BPM

## 2019-02-27 DIAGNOSIS — R51.9 HEADACHE DISORDER: Primary | ICD-10-CM

## 2019-02-27 PROCEDURE — 99212 OFFICE O/P EST SF 10 MIN: CPT | Performed by: OTHER

## 2019-02-27 RX ORDER — ROSUVASTATIN CALCIUM 10 MG/1
10 TABLET, COATED ORAL NIGHTLY
COMMUNITY
End: 2020-02-06

## 2019-02-27 NOTE — PROGRESS NOTES
Mr Trang De La Fuente, relates no recurrence of the visual distortion. An illustration of the visual distortion. The visual symptoms have been occurring since 2011. Discussed at length the results, MRI of the brain. Reviewed epic records.   He is recently developed

## 2019-03-17 RX ORDER — LORAZEPAM 0.5 MG/1
TABLET ORAL
Qty: 90 TABLET | Refills: 0 | Status: CANCELLED | OUTPATIENT
Start: 2019-03-17

## 2019-03-21 RX ORDER — LORAZEPAM 0.5 MG/1
TABLET ORAL
Qty: 90 TABLET | Refills: 1 | Status: SHIPPED
Start: 2019-03-21 | End: 2019-09-30

## 2019-03-22 NOTE — TELEPHONE ENCOUNTER
Noted.  I have printed out and signed a new prescription for the patient's med as requested. This new prescription can be faxed to the patient's pharmacy. I will leave the prescription on my nurses desk.   Please fax the prescription to the patient's phar

## 2019-06-02 ENCOUNTER — MA CHART PREP (OUTPATIENT)
Dept: FAMILY MEDICINE CLINIC | Facility: CLINIC | Age: 79
End: 2019-06-02

## 2019-06-19 ENCOUNTER — APPOINTMENT (OUTPATIENT)
Dept: LAB | Age: 79
End: 2019-06-19
Attending: INTERNAL MEDICINE
Payer: MEDICARE

## 2019-06-19 DIAGNOSIS — R97.20 ELEVATED PSA: ICD-10-CM

## 2019-06-19 DIAGNOSIS — E78.00 HYPERCHOLESTEREMIA: ICD-10-CM

## 2019-06-19 DIAGNOSIS — R73.01 ABNORMAL FASTING GLUCOSE: ICD-10-CM

## 2019-06-19 PROCEDURE — 80061 LIPID PANEL: CPT

## 2019-06-19 PROCEDURE — 82550 ASSAY OF CK (CPK): CPT

## 2019-06-19 PROCEDURE — 84153 ASSAY OF PSA TOTAL: CPT

## 2019-06-19 PROCEDURE — 82947 ASSAY GLUCOSE BLOOD QUANT: CPT

## 2019-06-19 PROCEDURE — 36415 COLL VENOUS BLD VENIPUNCTURE: CPT

## 2019-06-19 PROCEDURE — 84460 ALANINE AMINO (ALT) (SGPT): CPT

## 2019-06-19 PROCEDURE — 83036 HEMOGLOBIN GLYCOSYLATED A1C: CPT

## 2019-06-19 PROCEDURE — 84450 TRANSFERASE (AST) (SGOT): CPT

## 2019-06-26 ENCOUNTER — OFFICE VISIT (OUTPATIENT)
Dept: SURGERY | Facility: CLINIC | Age: 79
End: 2019-06-26
Payer: MEDICARE

## 2019-06-26 VITALS
HEART RATE: 69 BPM | BODY MASS INDEX: 28 KG/M2 | WEIGHT: 198 LBS | DIASTOLIC BLOOD PRESSURE: 69 MMHG | SYSTOLIC BLOOD PRESSURE: 109 MMHG

## 2019-06-26 DIAGNOSIS — N52.01 ERECTILE DYSFUNCTION DUE TO ARTERIAL INSUFFICIENCY: ICD-10-CM

## 2019-06-26 DIAGNOSIS — R97.20 ELEVATED PSA: Primary | ICD-10-CM

## 2019-06-26 DIAGNOSIS — N40.1 BENIGN NON-NODULAR PROSTATIC HYPERPLASIA WITH LOWER URINARY TRACT SYMPTOMS: ICD-10-CM

## 2019-06-26 DIAGNOSIS — R35.1 NOCTURIA: ICD-10-CM

## 2019-06-26 DIAGNOSIS — N48.6 PEYRONIE'S DISEASE: ICD-10-CM

## 2019-06-26 PROCEDURE — G0463 HOSPITAL OUTPT CLINIC VISIT: HCPCS | Performed by: UROLOGY

## 2019-06-26 PROCEDURE — 99214 OFFICE O/P EST MOD 30 MIN: CPT | Performed by: UROLOGY

## 2019-06-26 RX ORDER — TADALAFIL 20 MG/1
TABLET ORAL
Qty: 10 TABLET | Refills: 5 | Status: SHIPPED | OUTPATIENT
Start: 2019-06-26 | End: 2019-07-10

## 2019-06-26 NOTE — PROGRESS NOTES
HPI:    Patient ID: Jody Romero is a 70year old male. HPI  Elevated PSA  Chronic. Problem started 2002.  Patient denies associated symptoms to suggest prostate cancer such as weight loss or loss of appetite or bone pain and denies associated symptoms t voiding dysfunction is stable compared to last time. Patient states he feels \"pleased\" about his urinating problem. Peyronie's Disease  Chronic.  The patient states the penis has been curving to the left side during erections since 2016; at most a 30 d 5 mg daily; visit in 5 - 6 months with PSA -- total and free before visit      Review of Systems   Constitutional: Negative for fever. HENT: Negative for voice change. Respiratory: Negative for chest tightness and shortness of breath.     Gaby Medley TAKE 1 CAPSULE(0.4 MG) BY MOUTH DAILY Disp: 90 capsule Rfl: 3   finasteride 5 MG Oral Tab Take 1 tablet (5 mg total) by mouth daily.  Disp: 90 tablet Rfl: 3   Azelastine HCl 0.15 % Nasal Solution SPRAY ONE SPRAY IN EACH NOSTRIL TWICE DAILY IN EACH NOSTRIL A Never Smoker      Smokeless tobacco: Never Used    Alcohol use:  Yes      Alcohol/week: 1.2 oz      Types: 2 Glasses of wine per week      Frequency: 2-3 times a week      Drinks per session: 1 or 2      Binge frequency: Never    Drug use: No       Over the place, and time. Skin: Skin is dry. Psychiatric: He has a normal mood and affect. Thought content normal.   Nursing note and vitals reviewed. 06/26/19  1120   BP: 109/69   Pulse: 69   Weight: 198 lb (89.8 kg)         Body mass index is 27.62 kg/m². prostate wide, 4+ enlarged, greater than 50 grams, no palpable nodules or indurations. He notes improvement in urination after starting finasteride.  Discussed continuing long term finasteride vs greenlight laser ablation of prostate under general anesthesi tablet--at least 1 hour before planned sexual activity; do not take within 4 hours of taking tamsulosin. 4.  Visit with me January 2019.   Anywhere from 1--21 days before visit, please get blood draw for PSA--total and free and urinalysis urine test befo

## 2019-06-26 NOTE — PATIENT INSTRUCTIONS
Tanisha Salazar M.D.      1.  Continue tamsulosin 0.4 mg daily    2. Continue finasteride 5 mg daily    3.   For erectile dysfunction, tadalafil/Cialis 20 mg tablet--at least 1 hour before planned sexual activity; do not take

## 2019-07-10 ENCOUNTER — OFFICE VISIT (OUTPATIENT)
Dept: INTERNAL MEDICINE CLINIC | Facility: CLINIC | Age: 79
End: 2019-07-10
Payer: MEDICARE

## 2019-07-10 VITALS
HEIGHT: 71 IN | OXYGEN SATURATION: 98 % | TEMPERATURE: 98 F | WEIGHT: 195 LBS | DIASTOLIC BLOOD PRESSURE: 60 MMHG | BODY MASS INDEX: 27.3 KG/M2 | HEART RATE: 76 BPM | SYSTOLIC BLOOD PRESSURE: 104 MMHG

## 2019-07-10 DIAGNOSIS — Z00.00 ANNUAL PHYSICAL EXAM: ICD-10-CM

## 2019-07-10 DIAGNOSIS — G62.9 PERIPHERAL POLYNEUROPATHY: ICD-10-CM

## 2019-07-10 DIAGNOSIS — E78.00 HYPERCHOLESTEREMIA: ICD-10-CM

## 2019-07-10 DIAGNOSIS — I70.0 ATHEROSCLEROSIS OF AORTA (HCC): ICD-10-CM

## 2019-07-10 DIAGNOSIS — R73.01 ABNORMAL FASTING GLUCOSE: ICD-10-CM

## 2019-07-10 DIAGNOSIS — N40.0 BENIGN NON-NODULAR PROSTATIC HYPERPLASIA WITHOUT LOWER URINARY TRACT SYMPTOMS: ICD-10-CM

## 2019-07-10 DIAGNOSIS — R97.20 ELEVATED PSA: ICD-10-CM

## 2019-07-10 DIAGNOSIS — R74.8 ELEVATED CPK: ICD-10-CM

## 2019-07-10 DIAGNOSIS — Z86.010 HISTORY OF ADENOMATOUS POLYP OF COLON: ICD-10-CM

## 2019-07-10 DIAGNOSIS — R53.83 FATIGUE, UNSPECIFIED TYPE: ICD-10-CM

## 2019-07-10 DIAGNOSIS — I25.10 ASHD (ARTERIOSCLEROTIC HEART DISEASE): Primary | ICD-10-CM

## 2019-07-10 DIAGNOSIS — M15.9 PRIMARY OSTEOARTHRITIS INVOLVING MULTIPLE JOINTS: ICD-10-CM

## 2019-07-10 DIAGNOSIS — M48.061 SPINAL STENOSIS OF LUMBAR REGION WITHOUT NEUROGENIC CLAUDICATION: ICD-10-CM

## 2019-07-10 DIAGNOSIS — K57.30 DIVERTICULOSIS OF COLON: ICD-10-CM

## 2019-07-10 PROCEDURE — 99214 OFFICE O/P EST MOD 30 MIN: CPT | Performed by: INTERNAL MEDICINE

## 2019-07-10 NOTE — PROGRESS NOTES
Saurabh Schmitt is a 70year old male. Patient presents with:  Checkup: 6 mo f/u  Ashd  Hyperlipidemia  Arthritis    HPI:   Patient presents with:  Checkup: 6 mo f/u  Ashd  Hyperlipidemia  Arthritis    Pt feels well. No c/o chest pain or SOB.   Patient feels t 100 MG Oral Cap Take 1 capsule by mouth daily. Disp:  Rfl:    ibuprofen (ADVIL) 200 MG Oral Tab Take 1 tablet by mouth daily as needed. Disp:  Rfl:    Ascorbic Acid (VITAMIN C) 1000 MG Oral Tab Take 1 tablet by mouth daily.  Disp:  Rfl:       Past Medical EKG for his annual physical examination. The blood tests will include a CBC, CMP, hemoglobin A1c, lipid panel, and TSH. Patient will have his PSA done with Dr. Cynthia Proctor. Patient will also have a urinalysis.   Patient will have an EKG at his next visit a

## 2019-07-10 NOTE — PATIENT INSTRUCTIONS
1.  Patient is to continue his current diet, medication and activity. 2.  I will plan to see the patient back in 5-6 months with blood tests, urinalysis and EKG for his annual physical examination. 3.  Patient back sooner as necessary.

## 2019-07-15 RX ORDER — FINASTERIDE 5 MG/1
TABLET, FILM COATED ORAL
Qty: 90 TABLET | Refills: 3 | Status: SHIPPED | OUTPATIENT
Start: 2019-07-15 | End: 2020-07-07

## 2019-07-15 NOTE — TELEPHONE ENCOUNTER
Pt LOV with Dr. Nolan Kc 6/26/19 pt requesting refill on finasteride if you agree please review and sign med. I copied and pasted part of PV[facility] note below. 1.  Continue tamsulosin 0.4 mg daily     2. Continue finasteride 5 mg daily     3.   For erectile

## 2019-09-06 NOTE — TELEPHONE ENCOUNTER
Pt LOV with Dr. Alejandro Yates 6/26/19 pt pharmacy requesting refill on tamsulosin if you agree please review and sign med. I copied and pasted part of [facility] note below,    1. Continue tamsulosin 0.4 mg daily     2. Continue finasteride 5 mg daily     3.   For e

## 2019-09-08 RX ORDER — TAMSULOSIN HYDROCHLORIDE 0.4 MG/1
CAPSULE ORAL
Qty: 90 CAPSULE | Refills: 3 | Status: SHIPPED | OUTPATIENT
Start: 2019-09-08 | End: 2020-02-06

## 2019-09-09 RX ORDER — TAMSULOSIN HYDROCHLORIDE 0.4 MG/1
CAPSULE ORAL
Qty: 90 CAPSULE | Refills: 3 | Status: SHIPPED | OUTPATIENT
Start: 2019-09-09 | End: 2021-02-22

## 2019-10-02 RX ORDER — LORAZEPAM 0.5 MG/1
TABLET ORAL
Qty: 90 TABLET | Refills: 1 | Status: SHIPPED
Start: 2019-10-02 | End: 2020-03-24

## 2019-10-02 NOTE — TELEPHONE ENCOUNTER
Noted.  I have printed out and signed a new prescription for the patient's medication as requested. Please fax this prescription to the patient's pharmacy and notify the patient this is been done. I will route this to nursing.   Thank you!!

## 2019-10-07 NOTE — TELEPHONE ENCOUNTER
Called [facility] to verify script was faxed, as their is no documentation. Pharmacy verified receipt of script.

## 2019-10-27 ENCOUNTER — MA CHART PREP (OUTPATIENT)
Dept: FAMILY MEDICINE CLINIC | Facility: CLINIC | Age: 79
End: 2019-10-27

## 2019-10-27 PROBLEM — N48.6 PEYRONIE DISEASE: Status: ACTIVE | Noted: 2019-10-27

## 2019-12-11 ENCOUNTER — LAB ENCOUNTER (OUTPATIENT)
Dept: LAB | Age: 79
End: 2019-12-11
Attending: INTERNAL MEDICINE
Payer: MEDICARE

## 2019-12-11 DIAGNOSIS — R35.1 NOCTURIA: ICD-10-CM

## 2019-12-11 DIAGNOSIS — R73.01 ABNORMAL FASTING GLUCOSE: ICD-10-CM

## 2019-12-11 DIAGNOSIS — R97.20 ELEVATED PSA: ICD-10-CM

## 2019-12-11 DIAGNOSIS — E78.00 HYPERCHOLESTEREMIA: ICD-10-CM

## 2019-12-11 DIAGNOSIS — R53.83 FATIGUE, UNSPECIFIED TYPE: ICD-10-CM

## 2019-12-11 DIAGNOSIS — Z00.00 ANNUAL PHYSICAL EXAM: ICD-10-CM

## 2019-12-11 PROCEDURE — 83036 HEMOGLOBIN GLYCOSYLATED A1C: CPT

## 2019-12-11 PROCEDURE — 81003 URINALYSIS AUTO W/O SCOPE: CPT

## 2019-12-11 PROCEDURE — 80061 LIPID PANEL: CPT

## 2019-12-11 PROCEDURE — 80053 COMPREHEN METABOLIC PANEL: CPT

## 2019-12-11 PROCEDURE — 84443 ASSAY THYROID STIM HORMONE: CPT

## 2019-12-11 PROCEDURE — 84153 ASSAY OF PSA TOTAL: CPT

## 2019-12-11 PROCEDURE — 36415 COLL VENOUS BLD VENIPUNCTURE: CPT

## 2019-12-11 PROCEDURE — 85025 COMPLETE CBC W/AUTO DIFF WBC: CPT

## 2019-12-18 ENCOUNTER — OFFICE VISIT (OUTPATIENT)
Dept: INTERNAL MEDICINE CLINIC | Facility: CLINIC | Age: 79
End: 2019-12-18
Payer: MEDICARE

## 2019-12-18 ENCOUNTER — APPOINTMENT (OUTPATIENT)
Dept: LAB | Age: 79
End: 2019-12-18
Attending: INTERNAL MEDICINE
Payer: MEDICARE

## 2019-12-18 VITALS
SYSTOLIC BLOOD PRESSURE: 100 MMHG | TEMPERATURE: 97 F | OXYGEN SATURATION: 97 % | WEIGHT: 194 LBS | HEART RATE: 64 BPM | HEIGHT: 71 IN | DIASTOLIC BLOOD PRESSURE: 70 MMHG | BODY MASS INDEX: 27.16 KG/M2

## 2019-12-18 DIAGNOSIS — Z86.010 HISTORY OF ADENOMATOUS POLYP OF COLON: ICD-10-CM

## 2019-12-18 DIAGNOSIS — I25.10 ASHD (ARTERIOSCLEROTIC HEART DISEASE): ICD-10-CM

## 2019-12-18 DIAGNOSIS — R73.01 ABNORMAL FASTING GLUCOSE: ICD-10-CM

## 2019-12-18 DIAGNOSIS — Z00.00 ANNUAL PHYSICAL EXAM: Primary | ICD-10-CM

## 2019-12-18 DIAGNOSIS — K57.30 DIVERTICULOSIS OF COLON: ICD-10-CM

## 2019-12-18 DIAGNOSIS — R74.8 ELEVATED CPK: ICD-10-CM

## 2019-12-18 DIAGNOSIS — M15.9 PRIMARY OSTEOARTHRITIS INVOLVING MULTIPLE JOINTS: ICD-10-CM

## 2019-12-18 DIAGNOSIS — R97.20 ELEVATED PSA: ICD-10-CM

## 2019-12-18 DIAGNOSIS — M48.061 SPINAL STENOSIS OF LUMBAR REGION WITHOUT NEUROGENIC CLAUDICATION: ICD-10-CM

## 2019-12-18 DIAGNOSIS — I70.0 ATHEROSCLEROSIS OF AORTA (HCC): ICD-10-CM

## 2019-12-18 DIAGNOSIS — N40.0 BENIGN NON-NODULAR PROSTATIC HYPERPLASIA WITHOUT LOWER URINARY TRACT SYMPTOMS: ICD-10-CM

## 2019-12-18 DIAGNOSIS — E78.00 HYPERCHOLESTEREMIA: ICD-10-CM

## 2019-12-18 DIAGNOSIS — R53.83 FATIGUE, UNSPECIFIED TYPE: ICD-10-CM

## 2019-12-18 PROCEDURE — 99397 PER PM REEVAL EST PAT 65+ YR: CPT | Performed by: INTERNAL MEDICINE

## 2019-12-18 PROCEDURE — G0439 PPPS, SUBSEQ VISIT: HCPCS | Performed by: INTERNAL MEDICINE

## 2019-12-18 PROCEDURE — 93000 ELECTROCARDIOGRAM COMPLETE: CPT | Performed by: INTERNAL MEDICINE

## 2019-12-18 PROCEDURE — 36415 COLL VENOUS BLD VENIPUNCTURE: CPT

## 2019-12-18 PROCEDURE — 82550 ASSAY OF CK (CPK): CPT

## 2019-12-18 PROCEDURE — 96160 PT-FOCUSED HLTH RISK ASSMT: CPT | Performed by: INTERNAL MEDICINE

## 2019-12-18 NOTE — PATIENT INSTRUCTIONS
1.  Patient is to continue his current diet, medication and activity. 2.  Patient is to resume taking his Crestor as he has been taking it, twice a week. 3.  Patient has declined a flu vaccine.   4.  Patient will follow-up with Dr. Lavonne Wilhelm with a stress te

## 2019-12-19 NOTE — PROGRESS NOTES
Beverly Cisse is a 70year old male who presents for a complete physical exam.   HPI:   Mr. Philip Fernandez is a 57-year-old white male who was seen by me on December 18, 2019 for his Medicare advantage annual physical examination.   At the time of examsaskiatio • LUMIGAN 0.01 % Ophthalmic Solution Place 1 drop into both eyes daily. 3   • NON FORMULARY L-Arginine Powder 100mg orally daily     • cetirizine 10 MG Oral Tab Take 10 mg by mouth nightly as needed for Allergies.      • TURMERIC OR Turmeric Plus Ginger tobacco: Never Used    Alcohol use:  Yes      Alcohol/week: 2.0 standard drinks      Types: 2 Glasses of wine per week      Frequency: 2-3 times a week      Drinks per session: 1 or 2      Binge frequency: Never    Drug use: No          REVIEW OF SYSTEMS: Patient's lipid panel had a cholesterol of 243, triglycerides were 72, HDL cholesterol 75 and LDL cholesterol was 172. Patient's PSA was 2.64. Patient's TSH was 1.25.    ASSESSMENT AND PLAN:   1.  Annual physical exam  Patient appears to be doing well at this.  CPM.    11. History of adenomatous polyp of colon  Stable. CPM.    12. Elevated CPK  Stable. CPM.  Patient did not have a CPK done prior to this examination.   Patient was given an order to get a CPK done sometime in the next few weeks and also a C (PHQ-2/PHQ-9): Over the LAST 2 WEEKS   Little interest or pleasure in doing things (over the last two weeks)?: Not at all    Feeling down, depressed, or hopeless (over the last two weeks)?: Not at all    PHQ-2 SCORE: 0        Advance Directives     Do you flowsheet data found.     Glaucoma Screening      Ophthalmology Visit Annually Pt sees his Ophthalmologist.    Prostate Cancer Screening      PSA  Annually PSA due on 12/11/2020  Update Health Maintenance if applicable   Immunizations      Influenza No orde

## 2020-01-26 ENCOUNTER — TELEPHONE (OUTPATIENT)
Dept: INTERNAL MEDICINE CLINIC | Facility: CLINIC | Age: 80
End: 2020-01-26

## 2020-01-26 NOTE — TELEPHONE ENCOUNTER
Telephone call to pt. Pt's recent CPK was elevated to 882. Pt is aware. I will repeat his CPK prior to his next visit. Order has already been placed.

## 2020-02-06 ENCOUNTER — OFFICE VISIT (OUTPATIENT)
Dept: SURGERY | Facility: CLINIC | Age: 80
End: 2020-02-06
Payer: MEDICARE

## 2020-02-06 VITALS — SYSTOLIC BLOOD PRESSURE: 108 MMHG | BODY MASS INDEX: 28 KG/M2 | WEIGHT: 202 LBS | DIASTOLIC BLOOD PRESSURE: 64 MMHG

## 2020-02-06 DIAGNOSIS — N48.6 PEYRONIE'S DISEASE: ICD-10-CM

## 2020-02-06 DIAGNOSIS — R97.20 ELEVATED PSA: Primary | ICD-10-CM

## 2020-02-06 DIAGNOSIS — N52.01 ERECTILE DYSFUNCTION DUE TO ARTERIAL INSUFFICIENCY: ICD-10-CM

## 2020-02-06 DIAGNOSIS — N40.1 BENIGN NON-NODULAR PROSTATIC HYPERPLASIA WITH LOWER URINARY TRACT SYMPTOMS: ICD-10-CM

## 2020-02-06 DIAGNOSIS — R35.1 NOCTURIA: ICD-10-CM

## 2020-02-06 PROCEDURE — 99214 OFFICE O/P EST MOD 30 MIN: CPT | Performed by: UROLOGY

## 2020-02-06 NOTE — PATIENT INSTRUCTIONS
Alexia Rocha M.D.    1.   Continue tamsulosin 0.4 mg daily    2. Continue finasteride 5 mg daily    3.      For erectile dysfunction, tadalafil/Cialis 20 mg tablet--at least 1 hour before planned sexual activity; do not ta

## 2020-02-06 NOTE — PROGRESS NOTES
HPI:    Patient ID: Mor Marion is a 70year old male. HPI     Elevated PSA  Chronic. Problem started 2002.  Patient denies associated symptoms to suggest prostate cancer such as weight loss or loss of appetite or bone pain and denies associated symptom 0.4 mg daily and finasteride 5 mg daily which significantly improves voiding problem. The patient feels that the voiding dysfunction is stable compared to last time.  Patient states he feels \"pleased to mostly satisfied\" about his urinating problem.     P tablet to 2 tablets at least 2 hours if not more before planned sexual activity; continue tamsulosin 0.4 mg daily; continue finasteride 5 mg daily; PSA ordered  6/26/2019 Office visit with me;  On BRENDA, prostate wide, 4+ enlarged, greater than 50 grams, no p Allergies. • TURMERIC OR Turmeric Plus Ginger 1650/300mg daily      • Azelastine HCl 0.15 % Nasal Solution SPRAY ONE SPRAY IN EACH NOSTRIL TWICE DAILY IN EACH NOSTRIL AS NEEDED 30 mL 3   • melatonin 3 MG Oral Tab Take 6 mg by mouth nightly.        • sinan had a sensation of not emptying your bladder completely after you finish urinating?: Not at all  Over the past month, how often have you had to urinate again less than 2 hours after you finished urinating?: Less than half the time  Over the past month, how indicated  6/19/2019 PSA = 3.15 x 2 = 6.30 (adjusted for finasteride)   12/04/2018 PSA = 3.3 x 1.5 = 4.95 (started finasteride August 1, 2018 adjusted PSA is 4.95); Free PSA = 24% (probability of prostate cancer 16%);  UA RBC= <1; WBC= 0  8/16/2018 UA WBC = base, no palpable nodules or indurations. He notes improvement in urination after starting finasteride.  I fully explained to patient the benefits, risks, complications, side effects, reasons for, nature of, alternatives of continuing finasteride 5 mg daily follow up in 11 months with UA before the visit.        I explained to patient the risks, side effects, and alternatives, and I answered questions concerning them; patient understands all of this and decides to proceed with the following:       Treatment Pl

## 2020-03-18 ENCOUNTER — TELEPHONE (OUTPATIENT)
Dept: OTOLARYNGOLOGY | Facility: CLINIC | Age: 80
End: 2020-03-18

## 2020-03-18 ENCOUNTER — TELEPHONE (OUTPATIENT)
Dept: INTERNAL MEDICINE CLINIC | Facility: CLINIC | Age: 80
End: 2020-03-18

## 2020-03-18 NOTE — TELEPHONE ENCOUNTER
Pt called stating pt was referred by Dr. Michell Riggs to Dr. William Phillips for on and off clogged ear after scuba diving in [facility] operated by [facility] one week ago. Also pressure. Can pt be seen.   Call

## 2020-03-18 NOTE — TELEPHONE ENCOUNTER
Telephone call to patient and situation discussed. Patient was scuba diving in [facility] and \"came up too fast\" and has had difficulty with 1 of his ears. He feels his hearing is \"in and out\".   I will refer the patient see Dr. Ori Barajas for ENT evaluati

## 2020-03-18 NOTE — TELEPHONE ENCOUNTER
patient stated right ear is clogged ,no pain,no discharges,advised due to [address] encouraged pt to stay home ,pt verbalized understanding and scheduled on 4/20/20.

## 2020-03-18 NOTE — TELEPHONE ENCOUNTER
Pt is calling for a recommendation for an Ear Dr  He had a scuba diving accident and is having trouble with his ear clearing in and out  Please call pt with Info [phone]

## 2020-03-24 ENCOUNTER — TELEPHONE (OUTPATIENT)
Dept: INTERNAL MEDICINE CLINIC | Facility: CLINIC | Age: 80
End: 2020-03-24

## 2020-03-24 NOTE — TELEPHONE ENCOUNTER
To MD:  The above refill request is for a controlled substance. Please review pended medication order. Print and sign for staff to fax to pharmacy or prescribe electronically.     [place]  - 2/26/2020 #30    Last refilled - 10/2/2019 #90/1

## 2020-03-25 RX ORDER — LORAZEPAM 0.5 MG/1
TABLET ORAL
Qty: 90 TABLET | Refills: 1 | Status: SHIPPED | OUTPATIENT
Start: 2020-03-25 | End: 2020-09-28

## 2020-05-04 ENCOUNTER — OFFICE VISIT (OUTPATIENT)
Dept: AUDIOLOGY | Facility: CLINIC | Age: 80
End: 2020-05-04
Payer: MEDICARE

## 2020-05-04 ENCOUNTER — OFFICE VISIT (OUTPATIENT)
Dept: OTOLARYNGOLOGY | Facility: CLINIC | Age: 80
End: 2020-05-04
Payer: MEDICARE

## 2020-05-04 VITALS
HEART RATE: 74 BPM | SYSTOLIC BLOOD PRESSURE: 113 MMHG | BODY MASS INDEX: 28 KG/M2 | DIASTOLIC BLOOD PRESSURE: 74 MMHG | HEIGHT: 71 IN | RESPIRATION RATE: 18 BRPM | TEMPERATURE: 97 F | WEIGHT: 200 LBS

## 2020-05-04 DIAGNOSIS — H90.3 SENSORINEURAL HEARING LOSS, BILATERAL: Primary | ICD-10-CM

## 2020-05-04 PROCEDURE — 99203 OFFICE O/P NEW LOW 30 MIN: CPT | Performed by: OTOLARYNGOLOGY

## 2020-05-04 PROCEDURE — 92567 TYMPANOMETRY: CPT | Performed by: AUDIOLOGIST

## 2020-05-04 PROCEDURE — 92557 COMPREHENSIVE HEARING TEST: CPT | Performed by: AUDIOLOGIST

## 2020-05-04 NOTE — PROGRESS NOTES
Mikey Gonzalez is a 70year old male. Patient presents with:  Ear Problem: scuba accident in March 2020 and started having right ear pain and clogged.  States hearing has almost returned fully     HPI:   He was scuba diving almost 2 months ago and had a hard NOSTRIL TWICE DAILY IN EACH NOSTRIL AS NEEDED 30 mL 3   • melatonin 3 MG Oral Tab Take 6 mg by mouth nightly. • loratadine 10 MG Oral Tab Take 10 mg by mouth daily as needed for Allergies. • aspirin 81 MG Oral Tab Take 81 mg by mouth daily.      • Normal Inspection - Normal. Palpation - Normal. Parotid gland - Normal. Thyroid gland - Normal.   Psychiatric Normal Orientation - Oriented to time, place, person & situation. Appropriate mood and affect. Lymph Detail Normal Submental. Submandibular.  Ant

## 2020-05-04 NOTE — PATIENT INSTRUCTIONS
How Hearing Aids Can Help You     Losing your hearing can be frustrating. But hearing aids can help you hear what Elia Oates been missing. Not everyone who has hearing loss needs hearing aids.  Hearing aids will most likely help you if your hearing loss:  · K © 3906-1582 [employer] [address]. [address] [facility], [facility] [place]. All rights reserved. This information is not intended as a substitute for professional medical care. Always follow your healthcare professional's instructions.

## 2020-07-07 RX ORDER — FINASTERIDE 5 MG/1
TABLET, FILM COATED ORAL
Qty: 90 TABLET | Refills: 3 | Status: SHIPPED | OUTPATIENT
Start: 2020-07-07 | End: 2021-07-07

## 2020-07-07 NOTE — TELEPHONE ENCOUNTER
Pt LOV with PVK 2/6/2020 pt pharmacy requesting refill on finasteride if you agree please review and sign med, I copied and pasted part of PVK last note below. 1.   Continue tamsulosin 0.4 mg daily     2.    Continue finasteride 5 mg daily

## 2020-07-31 ENCOUNTER — TELEPHONE (OUTPATIENT)
Dept: CASE MANAGEMENT | Age: 80
End: 2020-07-31

## 2020-09-14 ENCOUNTER — TELEPHONE (OUTPATIENT)
Dept: INTERNAL MEDICINE CLINIC | Facility: CLINIC | Age: 80
End: 2020-09-14

## 2020-09-14 DIAGNOSIS — E55.9 VITAMIN D DEFICIENCY: Primary | ICD-10-CM

## 2020-09-15 NOTE — TELEPHONE ENCOUNTER
Noted.  I have ordered a vitamin D level as requested. Please notify the patient that I have placed a vitamin D order in the system for him to have done. I will route this to nursing.   Thank you!!

## 2020-09-27 ENCOUNTER — TELEPHONE (OUTPATIENT)
Dept: INTERNAL MEDICINE CLINIC | Facility: CLINIC | Age: 80
End: 2020-09-27

## 2020-09-28 RX ORDER — LORAZEPAM 0.5 MG/1
TABLET ORAL
Qty: 90 TABLET | Refills: 1 | Status: SHIPPED | OUTPATIENT
Start: 2020-09-28 | End: 2021-03-25

## 2020-10-05 ENCOUNTER — PATIENT MESSAGE (OUTPATIENT)
Dept: SURGERY | Facility: CLINIC | Age: 80
End: 2020-10-05

## 2020-10-05 DIAGNOSIS — R97.20 ELEVATED PSA: ICD-10-CM

## 2020-10-05 DIAGNOSIS — R36.9 BLOODY DISCHARGE FROM PENIS: Primary | ICD-10-CM

## 2020-10-07 NOTE — TELEPHONE ENCOUNTER
From: Ludin Silva  To: Heike Werner MD  Sent: 10/5/2020 10:17 AM CDT  Subject: Test Results Question    Dr Ordonez Fly: My annual physical is scheduled for 10/23, and my blood tests are scheduled for 10/14.   I have recently started spotting a couple of

## 2020-10-08 NOTE — TELEPHONE ENCOUNTER
I sent patient the following message by means of \"my chart\" =  Armando Wren,  I reviewed your records. Because of the bloody spotting, please call office to make an appointment to see me anytime during the next several weeks.   2--10 days before visit, please lazo

## 2020-10-14 ENCOUNTER — LAB ENCOUNTER (OUTPATIENT)
Dept: LAB | Age: 80
End: 2020-10-14
Attending: INTERNAL MEDICINE
Payer: MEDICARE

## 2020-10-14 DIAGNOSIS — R97.20 ELEVATED PSA: ICD-10-CM

## 2020-10-14 DIAGNOSIS — E78.00 HYPERCHOLESTEREMIA: ICD-10-CM

## 2020-10-14 DIAGNOSIS — R74.8 ELEVATED CPK: ICD-10-CM

## 2020-10-14 DIAGNOSIS — E55.9 VITAMIN D DEFICIENCY: ICD-10-CM

## 2020-10-14 DIAGNOSIS — R73.01 ABNORMAL FASTING GLUCOSE: ICD-10-CM

## 2020-10-14 DIAGNOSIS — R36.9 BLOODY DISCHARGE FROM PENIS: ICD-10-CM

## 2020-10-14 PROCEDURE — 80061 LIPID PANEL: CPT

## 2020-10-14 PROCEDURE — 88108 CYTOPATH CONCENTRATE TECH: CPT

## 2020-10-14 PROCEDURE — 82947 ASSAY GLUCOSE BLOOD QUANT: CPT

## 2020-10-14 PROCEDURE — 81003 URINALYSIS AUTO W/O SCOPE: CPT

## 2020-10-14 PROCEDURE — 84153 ASSAY OF PSA TOTAL: CPT

## 2020-10-14 PROCEDURE — 36415 COLL VENOUS BLD VENIPUNCTURE: CPT

## 2020-10-14 PROCEDURE — 83036 HEMOGLOBIN GLYCOSYLATED A1C: CPT

## 2020-10-14 PROCEDURE — 82306 VITAMIN D 25 HYDROXY: CPT

## 2020-10-14 PROCEDURE — 84450 TRANSFERASE (AST) (SGOT): CPT

## 2020-10-14 PROCEDURE — 82550 ASSAY OF CK (CPK): CPT

## 2020-10-14 PROCEDURE — 84460 ALANINE AMINO (ALT) (SGPT): CPT

## 2020-10-23 ENCOUNTER — OFFICE VISIT (OUTPATIENT)
Dept: INTERNAL MEDICINE CLINIC | Facility: CLINIC | Age: 80
End: 2020-10-23
Payer: MEDICARE

## 2020-10-23 VITALS
WEIGHT: 202 LBS | OXYGEN SATURATION: 99 % | SYSTOLIC BLOOD PRESSURE: 108 MMHG | HEART RATE: 64 BPM | TEMPERATURE: 98 F | DIASTOLIC BLOOD PRESSURE: 70 MMHG | BODY MASS INDEX: 28.28 KG/M2 | HEIGHT: 71 IN

## 2020-10-23 DIAGNOSIS — R97.20 ELEVATED PSA: ICD-10-CM

## 2020-10-23 DIAGNOSIS — M48.061 SPINAL STENOSIS OF LUMBAR REGION WITHOUT NEUROGENIC CLAUDICATION: ICD-10-CM

## 2020-10-23 DIAGNOSIS — Z00.00 ANNUAL PHYSICAL EXAM: Primary | ICD-10-CM

## 2020-10-23 DIAGNOSIS — N40.0 BENIGN NON-NODULAR PROSTATIC HYPERPLASIA WITHOUT LOWER URINARY TRACT SYMPTOMS: ICD-10-CM

## 2020-10-23 DIAGNOSIS — E78.00 HYPERCHOLESTEREMIA: ICD-10-CM

## 2020-10-23 DIAGNOSIS — R74.8 ELEVATED CPK: ICD-10-CM

## 2020-10-23 DIAGNOSIS — I70.0 ATHEROSCLEROSIS OF AORTA (HCC): ICD-10-CM

## 2020-10-23 DIAGNOSIS — Z86.010 HISTORY OF ADENOMATOUS POLYP OF COLON: ICD-10-CM

## 2020-10-23 DIAGNOSIS — I25.10 ASHD (ARTERIOSCLEROTIC HEART DISEASE): ICD-10-CM

## 2020-10-23 DIAGNOSIS — G62.9 PERIPHERAL POLYNEUROPATHY: ICD-10-CM

## 2020-10-23 DIAGNOSIS — K57.30 DIVERTICULOSIS OF COLON: ICD-10-CM

## 2020-10-23 DIAGNOSIS — M15.9 PRIMARY OSTEOARTHRITIS INVOLVING MULTIPLE JOINTS: ICD-10-CM

## 2020-10-23 DIAGNOSIS — R73.01 ABNORMAL FASTING GLUCOSE: ICD-10-CM

## 2020-10-23 DIAGNOSIS — R53.83 FATIGUE, UNSPECIFIED TYPE: ICD-10-CM

## 2020-10-23 PROCEDURE — G0439 PPPS, SUBSEQ VISIT: HCPCS | Performed by: INTERNAL MEDICINE

## 2020-10-23 PROCEDURE — 82272 OCCULT BLD FECES 1-3 TESTS: CPT | Performed by: INTERNAL MEDICINE

## 2020-10-23 PROCEDURE — 93000 ELECTROCARDIOGRAM COMPLETE: CPT | Performed by: INTERNAL MEDICINE

## 2020-10-23 PROCEDURE — 90662 IIV NO PRSV INCREASED AG IM: CPT | Performed by: INTERNAL MEDICINE

## 2020-10-23 PROCEDURE — 99397 PER PM REEVAL EST PAT 65+ YR: CPT | Performed by: INTERNAL MEDICINE

## 2020-10-23 PROCEDURE — 3008F BODY MASS INDEX DOCD: CPT | Performed by: INTERNAL MEDICINE

## 2020-10-23 PROCEDURE — 3074F SYST BP LT 130 MM HG: CPT | Performed by: INTERNAL MEDICINE

## 2020-10-23 PROCEDURE — 96160 PT-FOCUSED HLTH RISK ASSMT: CPT | Performed by: INTERNAL MEDICINE

## 2020-10-23 PROCEDURE — G0008 ADMIN INFLUENZA VIRUS VAC: HCPCS | Performed by: INTERNAL MEDICINE

## 2020-10-23 PROCEDURE — 3078F DIAST BP <80 MM HG: CPT | Performed by: INTERNAL MEDICINE

## 2020-10-23 RX ORDER — AMOXICILLIN 500 MG/1
TABLET, FILM COATED ORAL
COMMUNITY
End: 2020-10-23

## 2020-10-23 RX ORDER — ACETAMINOPHEN AND CODEINE PHOSPHATE 300; 30 MG/1; MG/1
TABLET ORAL
COMMUNITY
End: 2020-10-23

## 2020-10-23 NOTE — PATIENT INSTRUCTIONS
1.  Patient is to continue his current diet, medication and activity. 2.  Patient to follow-up with his consultants including Dr. Brook Albarado, Dr. Destinee Aranda and Dr. Sydnie Mata.   3.  I will plan to see the patient back in 3 months with blood tests which will include

## 2020-10-23 NOTE — PROGRESS NOTES
Jorge Lindsay is a 70year old male who presents for a complete physical exam.   HPI:   Mr. Chin Rogel is a 29-year-old white male who was seen by me on October 23, 2020 for his Medicare advantage annual physical examination.   At the time the examsaskiatio Omega-3 Fatty Acids (OMEGA-3 EPA FISH OIL OR) 1500MG, DHA 750MG orally daily      • Cholecalciferol (VITAMIN D-3) 5000 units Oral Tab Take 1 tablet by mouth daily. • Alpha-Lipoic Acid 300 MG Oral Cap Take 1 tablet by mouth daily.      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   This is a new finding as patient's previous EKG was normal.  Patient had a recent urinalysis which appeared normal.  Patient's lipid panel had a cholesterol of 223, triglycerides were 89, HDL cholesterol is 53 and LDL cholesterol was Future  - ASSAY, THYROID STIM HORMONE; Future  - URINALYSIS WITH CULTURE REFLEX  - BLD OCLT PROXIDASE ACTV QUAL FECES 1 SPEC    2. ASHD (arteriosclerotic heart disease)  Patient has a history of ASHD.   Patient's EKG has changed from last year and now shows CPM.  Patient will continue to follow-up with Dr. Karol Cummings, his urologist, regarding his BPH. He can also share with him the information about the occasional spotting he noted recently which presumably is coming from his penis.   At the time of his examin afford your medications?: Yes    Hearing Problems?: No     Functional Status     Hearing Problems?: No    Vision Problems? : No    Difficulty walking?: No    Difficulty dressing or bathing?: No    Problems with daily activities? : No    Memory Problems?: N meeting or place of Zoroastrianism: Sometimes   Many people I talk to seem to mumble (or don't speak clearly): No People get annoyed because I misunderstand what they say: No   I misunderstand what others are saying and make inappropriate responses: No I avoid so 10/14/2021  Update Health Maintenance if applicable   Immunizations      Influenza No orders found for this or any previous visit.  Update Immunization Activity if applicable    Pneumococcal Orders placed or performed in visit on 06/21/17   • PNEUMOCOCCAL I

## 2020-10-23 NOTE — PROGRESS NOTES
Dr. Colten Shepard note, EKGs and lab results faxed to Dr. Valeria Michuad at [phone]. Fax confirmation received. Back to Dr. Colten Shepard desk.

## 2020-10-26 ENCOUNTER — TELEPHONE (OUTPATIENT)
Dept: SURGERY | Facility: CLINIC | Age: 80
End: 2020-10-26

## 2020-10-26 NOTE — TELEPHONE ENCOUNTER
Spoke with patient. Assisted in scheduling patient for next available appt with MD. PT confirmed and verbalized understanding.      Future Appointments   Date Time Provider Yoli Granados   11/3/2020  1:20 PM Sarah Minor MD [facility]   1/12/2

## 2020-10-26 NOTE — TELEPHONE ENCOUNTER
Pt states he was instructed by Dr. Roger Driscoll to make an appt sooner than January, LEONEL found no sooner openings. Please advise thank you.

## 2020-10-28 ENCOUNTER — LAB ENCOUNTER (OUTPATIENT)
Dept: LAB | Age: 80
End: 2020-10-28
Attending: INTERNAL MEDICINE
Payer: MEDICARE

## 2020-10-28 ENCOUNTER — OFFICE VISIT (OUTPATIENT)
Dept: INTERNAL MEDICINE CLINIC | Facility: CLINIC | Age: 80
End: 2020-10-28
Payer: MEDICARE

## 2020-10-28 VITALS
OXYGEN SATURATION: 98 % | BODY MASS INDEX: 28.48 KG/M2 | HEIGHT: 70.5 IN | DIASTOLIC BLOOD PRESSURE: 70 MMHG | TEMPERATURE: 98 F | SYSTOLIC BLOOD PRESSURE: 130 MMHG | HEART RATE: 76 BPM | WEIGHT: 201.19 LBS

## 2020-10-28 DIAGNOSIS — N40.1 BENIGN PROSTATIC HYPERPLASIA WITH URINARY FREQUENCY: Primary | ICD-10-CM

## 2020-10-28 DIAGNOSIS — R73.01 ABNORMAL FASTING GLUCOSE: ICD-10-CM

## 2020-10-28 DIAGNOSIS — Z00.00 ANNUAL PHYSICAL EXAM: ICD-10-CM

## 2020-10-28 DIAGNOSIS — N30.00 ACUTE CYSTITIS WITHOUT HEMATURIA: ICD-10-CM

## 2020-10-28 DIAGNOSIS — E78.00 HYPERCHOLESTEREMIA: ICD-10-CM

## 2020-10-28 DIAGNOSIS — R35.0 BENIGN PROSTATIC HYPERPLASIA WITH URINARY FREQUENCY: Primary | ICD-10-CM

## 2020-10-28 DIAGNOSIS — R53.83 FATIGUE, UNSPECIFIED TYPE: ICD-10-CM

## 2020-10-28 PROCEDURE — 3075F SYST BP GE 130 - 139MM HG: CPT | Performed by: INTERNAL MEDICINE

## 2020-10-28 PROCEDURE — 99214 OFFICE O/P EST MOD 30 MIN: CPT | Performed by: INTERNAL MEDICINE

## 2020-10-28 PROCEDURE — 83036 HEMOGLOBIN GLYCOSYLATED A1C: CPT

## 2020-10-28 PROCEDURE — 3008F BODY MASS INDEX DOCD: CPT | Performed by: INTERNAL MEDICINE

## 2020-10-28 PROCEDURE — 80061 LIPID PANEL: CPT

## 2020-10-28 PROCEDURE — 84443 ASSAY THYROID STIM HORMONE: CPT

## 2020-10-28 PROCEDURE — 3078F DIAST BP <80 MM HG: CPT | Performed by: INTERNAL MEDICINE

## 2020-10-28 PROCEDURE — 36415 COLL VENOUS BLD VENIPUNCTURE: CPT

## 2020-10-28 PROCEDURE — 81003 URINALYSIS AUTO W/O SCOPE: CPT | Performed by: INTERNAL MEDICINE

## 2020-10-28 PROCEDURE — 80053 COMPREHEN METABOLIC PANEL: CPT

## 2020-10-28 PROCEDURE — 85025 COMPLETE CBC W/AUTO DIFF WBC: CPT

## 2020-10-28 RX ORDER — SULFAMETHOXAZOLE AND TRIMETHOPRIM 800; 160 MG/1; MG/1
1 TABLET ORAL 2 TIMES DAILY
Qty: 20 TABLET | Refills: 0 | Status: SHIPPED | OUTPATIENT
Start: 2020-10-28 | End: 2020-11-07

## 2020-10-28 NOTE — PATIENT INSTRUCTIONS
1.  Patient is to continue his current diet, medication and activity. 2.  Patient is to push fluids. 3.  Patient is to avoid coffee, tea, cola drinks and caffeine-containing compounds at this time.   4.  I will obtain a urinalysis with urine culture refle

## 2020-10-28 NOTE — PROGRESS NOTES
Ella Turcios is a 70year old male. Patient presents with:  Urinary  Urinary Urgency  Urinary Frequency    HPI:   Patient presents with:  Urinary  Urinary Urgency  Urinary Frequency    Patient presents for evaluation of the urinary problem.   Patient previou by mouth daily. • Alpha-Lipoic Acid 300 MG Oral Cap Take 1 tablet by mouth daily. • VITAMIN B COMPLEX-C OR Take 1 tablet by mouth daily. • LUMIGAN 0.01 % Ophthalmic Solution Place 1 drop into both eyes daily.   3   • cetirizine 10 MG Oral Tab Ta 201 lb 3.2 oz (91.3 kg)   SpO2 98%   BMI 28.46 kg/m²   GENERAL: well developed, well nourished in no acute distress  HEENT: normal oropharynx.  Ears are normal. Eyes are normal  NECK: supple,no lymphadenopathy or masses, no bruits  CHEST: Well-developed mal

## 2020-11-03 ENCOUNTER — OFFICE VISIT (OUTPATIENT)
Dept: SURGERY | Facility: CLINIC | Age: 80
End: 2020-11-03
Payer: MEDICARE

## 2020-11-03 VITALS
WEIGHT: 201 LBS | DIASTOLIC BLOOD PRESSURE: 74 MMHG | SYSTOLIC BLOOD PRESSURE: 124 MMHG | HEIGHT: 71 IN | HEART RATE: 78 BPM | BODY MASS INDEX: 28.14 KG/M2

## 2020-11-03 DIAGNOSIS — R97.20 ELEVATED PSA: Primary | ICD-10-CM

## 2020-11-03 DIAGNOSIS — Z79.82 LONG TERM CURRENT USE OF ASPIRIN: ICD-10-CM

## 2020-11-03 DIAGNOSIS — R35.0 BENIGN PROSTATIC HYPERPLASIA WITH URINARY FREQUENCY: ICD-10-CM

## 2020-11-03 DIAGNOSIS — R30.0 DYSURIA: ICD-10-CM

## 2020-11-03 DIAGNOSIS — N28.1 RENAL CYST: ICD-10-CM

## 2020-11-03 DIAGNOSIS — N48.6 PEYRONIE'S DISEASE: ICD-10-CM

## 2020-11-03 DIAGNOSIS — R35.1 NOCTURIA: ICD-10-CM

## 2020-11-03 DIAGNOSIS — R31.0 GROSS HEMATURIA: ICD-10-CM

## 2020-11-03 DIAGNOSIS — N52.01 ERECTILE DYSFUNCTION DUE TO ARTERIAL INSUFFICIENCY: ICD-10-CM

## 2020-11-03 DIAGNOSIS — N40.1 BENIGN PROSTATIC HYPERPLASIA WITH URINARY FREQUENCY: ICD-10-CM

## 2020-11-03 PROCEDURE — 3008F BODY MASS INDEX DOCD: CPT | Performed by: UROLOGY

## 2020-11-03 PROCEDURE — 3078F DIAST BP <80 MM HG: CPT | Performed by: UROLOGY

## 2020-11-03 PROCEDURE — 3074F SYST BP LT 130 MM HG: CPT | Performed by: UROLOGY

## 2020-11-03 PROCEDURE — 99215 OFFICE O/P EST HI 40 MIN: CPT | Performed by: UROLOGY

## 2020-11-03 NOTE — PROGRESS NOTES
HPI:    Patient ID: Nhi Rivas is a 70year old male. HPI     Elevated PSA  Chronic. Problem started 2002. Most recent 10/14/2020 PSA = 2.61 x 2 = 5.22 (adjusted for finasteride).  Patient presently denies any associated symptoms to suggest prostate ca states his dysuria was worse at night. Patient believed he may have had an infection.  The patient also states that he went to his PCP, Dr. Courtney Engle; 10/14/2020 UA = negative; Dr. Courtney Engle also believed patient had a bladder infection and prescribed him B both testes; On BRENDA, prostate >4+ enlarged; >50g, no palpable nodules or indurations  ; Continue tamsulosin 0.4 mg daily; Start finasteride 5 mg daily  01/23/2019 office visit with me;  On BRENDA, prostate 3+ enlarged, no palpable nodules or indurations; incre Fatty Acids (OMEGA-3 EPA FISH OIL OR) 1500MG, DHA 750MG orally daily      • Cholecalciferol (VITAMIN D-3) 5000 units Oral Tab Take 1 tablet by mouth daily. • Alpha-Lipoic Acid 300 MG Oral Cap Take 1 tablet by mouth daily.      • VITAMIN B COMPLEX-C OR T Onset   • Stroke Father    • Cancer Mother         brain tumor      Social History: Social History    Tobacco Use      Smoking status: Never Smoker      Smokeless tobacco: Never Used    Alcohol use:  Yes      Alcohol/week: 2.0 standard drinks      Types: 2 UA blood = negative; Microscopic not indicated  12/23/2015 PSA = 5.0  10/21/2015 PSA = 6.4  11/07/2013 PSA = 3.9  11/11/2011 PSA = 5.8  09/23/2009 PSA = 1.6  05/11/2002 PSA = 6.6       IMAGING   06/22/2020 1.5T MR LUMBAR SPINE (Alessandra Gordillo) makes this worse. He states his dysuria was worse at night. Patient believed he may have had an infection.  The patient also states that he went to his PCP, Dr. Cam Jeffrey; 10/14/2020 UA = negative; Dr. Cam Jeffrey also believed patient had a bladder infection renal cysts are common over the age of 36; they are not cancerous or precancerous and no further treatment is needed. We discussed renal US.  I fully explained to patient the benefits, risks, and alternatives to this treatment option and I answered patient' finasteride 5 mg daily    5. Visit in 4 months. Please get blood draw for PSA--total and free and renal/kidney ultrasound; also urinalysis urine test and urine culture before visit.       Orders Placed This Encounter      Urinalysis, Routine- Future

## 2020-11-03 NOTE — PATIENT INSTRUCTIONS
Jac Woo M.D.    1.  Please continue your 10-day course of Bactrim DS generic = trimethoprim–sulfamethoxazole twice daily ordered by Dr. Roblero Files    2.   Please submit urine specimen for complete urinalysis

## 2020-11-20 ENCOUNTER — PATIENT MESSAGE (OUTPATIENT)
Dept: INTERNAL MEDICINE CLINIC | Facility: CLINIC | Age: 80
End: 2020-11-20

## 2020-11-20 RX ORDER — TADALAFIL 20 MG/1
TABLET ORAL
Qty: 10 TABLET | Refills: 0 | Status: SHIPPED | OUTPATIENT
Start: 2020-11-20 | End: 2021-08-05

## 2020-11-20 NOTE — TELEPHONE ENCOUNTER
From: Rea Jeans  To: Marguerite Lomax MD  Sent: 11/20/2020 11:32 AM CST  Subject: Test Results Question    Just wanted to send you my thanks for the prescription to clear up my bladder infection.  Only after 3 days of taking the pills, my urine stream

## 2020-11-20 NOTE — TELEPHONE ENCOUNTER
Requesting refill for Tadalafil. LOV: 11/3/2020    Treatment Plan & Patient Instructions     1. Please continue your 10-day course of Bactrim DS generic = trimethoprim–sulfamethoxazole twice daily ordered by Dr. Michael Avila     2.   Please submit urine sp

## 2020-11-21 ENCOUNTER — TELEPHONE (OUTPATIENT)
Dept: INTERNAL MEDICINE CLINIC | Facility: CLINIC | Age: 80
End: 2020-11-21

## 2020-11-21 NOTE — TELEPHONE ENCOUNTER
Discussed with pt. Pt is happy with my diagnosis and treatment. Pt will follow up with me as scheduled.

## 2020-11-21 NOTE — TELEPHONE ENCOUNTER
Telephone call to pt and recent lab tests discussed with pt. Pt feels better with taking the Bactrim for his S/S of a UTI. Pt will follow up with me as scheduled.

## 2021-02-02 ENCOUNTER — TELEPHONE (OUTPATIENT)
Dept: NEUROLOGY | Facility: CLINIC | Age: 81
End: 2021-02-02

## 2021-02-02 ENCOUNTER — OFFICE VISIT (OUTPATIENT)
Dept: NEUROLOGY | Facility: CLINIC | Age: 81
End: 2021-02-02
Payer: MEDICARE

## 2021-02-02 VITALS — BODY MASS INDEX: 28.88 KG/M2 | HEIGHT: 70.5 IN | WEIGHT: 204 LBS

## 2021-02-02 DIAGNOSIS — M47.816 LUMBAR SPONDYLOSIS: Primary | ICD-10-CM

## 2021-02-02 DIAGNOSIS — I25.10 ASHD (ARTERIOSCLEROTIC HEART DISEASE): ICD-10-CM

## 2021-02-02 DIAGNOSIS — N40.0 BENIGN NON-NODULAR PROSTATIC HYPERPLASIA WITHOUT LOWER URINARY TRACT SYMPTOMS: ICD-10-CM

## 2021-02-02 DIAGNOSIS — G62.9 PERIPHERAL POLYNEUROPATHY: ICD-10-CM

## 2021-02-02 DIAGNOSIS — R73.01 ABNORMAL FASTING GLUCOSE: ICD-10-CM

## 2021-02-02 DIAGNOSIS — M96.1 POSTLAMINECTOMY SYNDROME OF LUMBAR REGION: ICD-10-CM

## 2021-02-02 DIAGNOSIS — M15.9 PRIMARY OSTEOARTHRITIS INVOLVING MULTIPLE JOINTS: ICD-10-CM

## 2021-02-02 PROBLEM — Z98.61 HISTORY OF PTCA: Status: ACTIVE | Noted: 2020-02-20

## 2021-02-02 PROCEDURE — 99204 OFFICE O/P NEW MOD 45 MIN: CPT | Performed by: PHYSICAL MEDICINE & REHABILITATION

## 2021-02-02 PROCEDURE — 3008F BODY MASS INDEX DOCD: CPT | Performed by: PHYSICAL MEDICINE & REHABILITATION

## 2021-02-02 NOTE — PROGRESS NOTES
[address]  Progress Note    CHIEF COMPLAINT:  Patient presents with:  Low Back Pain: New patient presents for potentiatlly epidural injections.  States that last injection was given by Dr. 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 ASSESSMENT AND PLAN:  1. Lumbar spondylosis  He has very little radiating symptoms below his mid thighs. Pain is axial and worse with standing and walking.   This is consisten

## 2021-02-02 NOTE — TELEPHONE ENCOUNTER
Bilateral L34, L45 and L5-S1 facet injections CPT CODE: 44031-87  69037 RT, 12193 LT, 18001 RT, 93031 LT - pending approval     [employer] online to initiate authorization for above.    Tracking # [id number]

## 2021-02-03 NOTE — TELEPHONE ENCOUNTER
Bilateral L34, L45 and L5-S1 facet injections CPT CODE: 92176-07  65086 RT, 61996 LT, 48895 RT, 90349 LT - APPROVED    [employer] online, request above is authorized.   Authorization #  [id number] effective date: 2/03/21 thru 3/20/21     Will inform MOMO

## 2021-02-03 NOTE — TELEPHONE ENCOUNTER
Patient wants to do Cervical injections with the Bilateral L34, L45 and L5-S1 facet injections, I informed the patient we are not able to do both injections together, but persistently asked that he wanted to me double check with his DrRamiro For the final word.

## 2021-02-03 NOTE — TELEPHONE ENCOUNTER
Contacted patient to let him know we are not able to do cervical and lumbar spine injections together.

## 2021-02-03 NOTE — TELEPHONE ENCOUNTER
Patient has been scheduled for a Bilateral L34, L45 and L5-S1 facet injections  on 2/11/21 at the [facility]. Medications and allergies reviewed.  Patient informed we will need verbal or written authorization from patients Primary Care Physician/Cardiologist to h

## 2021-02-11 ENCOUNTER — OFFICE VISIT (OUTPATIENT)
Dept: SURGERY | Facility: CLINIC | Age: 81
End: 2021-02-11

## 2021-02-11 DIAGNOSIS — M47.816 LUMBAR SPONDYLOSIS: Primary | ICD-10-CM

## 2021-02-11 PROCEDURE — 64493 INJ PARAVERT F JNT L/S 1 LEV: CPT | Performed by: PHYSICAL MEDICINE & REHABILITATION

## 2021-02-11 PROCEDURE — 64494 INJ PARAVERT F JNT L/S 2 LEV: CPT | Performed by: PHYSICAL MEDICINE & REHABILITATION

## 2021-02-11 PROCEDURE — 64495 INJ PARAVERT F JNT L/S 3 LEV: CPT | Performed by: PHYSICAL MEDICINE & REHABILITATION

## 2021-02-22 ENCOUNTER — TELEPHONE (OUTPATIENT)
Dept: SURGERY | Facility: CLINIC | Age: 81
End: 2021-02-22

## 2021-02-22 RX ORDER — TAMSULOSIN HYDROCHLORIDE 0.4 MG/1
0.4 CAPSULE ORAL
Qty: 90 CAPSULE | Refills: 0 | Status: SHIPPED | OUTPATIENT
Start: 2021-02-22 | End: 2021-05-21

## 2021-02-22 NOTE — TELEPHONE ENCOUNTER
Received refill request for tamsulosin 0.4 mg capsules     Patient's LOV within 12 months and is scheduled for follow up on 03/10/2021  Last PSA WNL  Medication meets refill criteria protocol, med approved, e-scribed to pharmacy for 3 month supply, 0 refil

## 2021-02-26 ENCOUNTER — OFFICE VISIT (OUTPATIENT)
Dept: NEUROLOGY | Facility: CLINIC | Age: 81
End: 2021-02-26
Payer: MEDICARE

## 2021-02-26 VITALS — WEIGHT: 192 LBS | BODY MASS INDEX: 27.18 KG/M2 | HEIGHT: 70.5 IN

## 2021-02-26 DIAGNOSIS — M96.1 POSTLAMINECTOMY SYNDROME OF LUMBAR REGION: ICD-10-CM

## 2021-02-26 DIAGNOSIS — M47.816 LUMBAR SPONDYLOSIS: Primary | ICD-10-CM

## 2021-02-26 DIAGNOSIS — N40.0 BENIGN NON-NODULAR PROSTATIC HYPERPLASIA WITHOUT LOWER URINARY TRACT SYMPTOMS: ICD-10-CM

## 2021-02-26 DIAGNOSIS — I25.10 ASHD (ARTERIOSCLEROTIC HEART DISEASE): ICD-10-CM

## 2021-02-26 PROCEDURE — 99214 OFFICE O/P EST MOD 30 MIN: CPT | Performed by: PHYSICAL MEDICINE & REHABILITATION

## 2021-02-26 PROCEDURE — 3008F BODY MASS INDEX DOCD: CPT | Performed by: PHYSICAL MEDICINE & REHABILITATION

## 2021-02-26 NOTE — PROGRESS NOTES
[address]  Progress Note    CHIEF COMPLAINT:  Patient presents with:  Low Back Pain: Patient presents for post bilateral L3-4, L4-5 andf L5-S1 facet joint injections.  Patient states that pain changes report Outpatient Medications   Medication Sig Dispense Refill   • tamsulosin (FLOMAX) cap Take 1 capsule (0.4 mg total) by mouth After Breakfast. TAKE 30 MINUTES AFTER MEAL 90 capsule 0   • Evolocumab 140 MG/ML Subcutaneous Solution Auto-injector      • LORAZEPA Disturbance: denies   Cardiovascular  Chest Pain: denies  Irregular Heartbeat: denies   Gastrointestinal  Bowel Incontinence: denies  Heartburn: denies   Genitourinary  Difficulty Urinating: denies  Bladder Incontinence: denies   Musculoskeletal  Joint Sti symptoms  Tricyclic medicines contraindicated with a history of BPH due to risk of urinary retention. No orders of the defined types were placed in this encounter.       RTC    Return in about 2 weeks (around 3/12/2021) for 2 week post injection follow

## 2021-03-01 ENCOUNTER — TELEPHONE (OUTPATIENT)
Dept: NEUROLOGY | Facility: CLINIC | Age: 81
End: 2021-03-01

## 2021-03-01 ENCOUNTER — LAB ENCOUNTER (OUTPATIENT)
Dept: LAB | Facility: REFERENCE LAB | Age: 81
End: 2021-03-01
Attending: INTERNAL MEDICINE
Payer: MEDICARE

## 2021-03-01 DIAGNOSIS — E78.00 HYPERCHOLESTEREMIA: ICD-10-CM

## 2021-03-01 DIAGNOSIS — R97.20 ELEVATED PSA: ICD-10-CM

## 2021-03-01 DIAGNOSIS — R30.0 DYSURIA: ICD-10-CM

## 2021-03-01 LAB
ALBUMIN SERPL-MCNC: 3.4 G/DL (ref 3.4–5)
ALBUMIN/GLOB SERPL: 0.9 {RATIO} (ref 1–2)
ALP LIVER SERPL-CCNC: 60 U/L
ALT SERPL-CCNC: 34 U/L
ANION GAP SERPL CALC-SCNC: 3 MMOL/L (ref 0–18)
AST SERPL-CCNC: 21 U/L (ref 15–37)
BILIRUB SERPL-MCNC: 0.6 MG/DL (ref 0.1–2)
BILIRUB UR QL: NEGATIVE
BUN BLD-MCNC: 17 MG/DL (ref 7–18)
BUN/CREAT SERPL: 16.7 (ref 10–20)
CALCIUM BLD-MCNC: 9.1 MG/DL (ref 8.5–10.1)
CHLORIDE SERPL-SCNC: 109 MMOL/L (ref 98–112)
CLARITY UR: CLEAR
CO2 SERPL-SCNC: 30 MMOL/L (ref 21–32)
COLOR UR: YELLOW
CREAT BLD-MCNC: 1.02 MG/DL
EST. AVERAGE GLUCOSE BLD GHB EST-MCNC: 131 MG/DL (ref 68–126)
GLOBULIN PLAS-MCNC: 3.6 G/DL (ref 2.8–4.4)
GLUCOSE BLD-MCNC: 103 MG/DL (ref 70–99)
GLUCOSE UR-MCNC: NEGATIVE MG/DL
HBA1C MFR BLD HPLC: 6.2 % (ref ?–5.7)
HGB UR QL STRIP.AUTO: NEGATIVE
KETONES UR-MCNC: NEGATIVE MG/DL
LEUKOCYTE ESTERASE UR QL STRIP.AUTO: NEGATIVE
M PROTEIN MFR SERPL ELPH: 7 G/DL (ref 6.4–8.2)
NITRITE UR QL STRIP.AUTO: NEGATIVE
OSMOLALITY SERPL CALC.SUM OF ELEC: 296 MOSM/KG (ref 275–295)
PATIENT FASTING Y/N/NP: YES
PH UR: 6 [PH] (ref 5–8)
POTASSIUM SERPL-SCNC: 4.4 MMOL/L (ref 3.5–5.1)
PROT UR-MCNC: NEGATIVE MG/DL
PSA SERPL-MCNC: 1.92 NG/ML (ref ?–4)
SODIUM SERPL-SCNC: 142 MMOL/L (ref 136–145)
SP GR UR STRIP: 1.02 (ref 1–1.03)
UROBILINOGEN UR STRIP-ACNC: <2

## 2021-03-01 PROCEDURE — 84153 ASSAY OF PSA TOTAL: CPT

## 2021-03-01 PROCEDURE — 83036 HEMOGLOBIN GLYCOSYLATED A1C: CPT

## 2021-03-01 PROCEDURE — 87086 URINE CULTURE/COLONY COUNT: CPT

## 2021-03-01 PROCEDURE — 81001 URINALYSIS AUTO W/SCOPE: CPT

## 2021-03-01 PROCEDURE — 80053 COMPREHEN METABOLIC PANEL: CPT

## 2021-03-01 PROCEDURE — 36415 COLL VENOUS BLD VENIPUNCTURE: CPT

## 2021-03-01 NOTE — TELEPHONE ENCOUNTER
Patient has been scheduled for a Bilateral L2, L3, L4 and L5 medial branch blocks on 3/18/21 at the [facility]. Medications and allergies reviewed.  Patient informed we will need verbal or written authorization from patients Primary Care Physician/Cardiologist to

## 2021-03-02 ENCOUNTER — TELEPHONE (OUTPATIENT)
Dept: NEUROLOGY | Facility: CLINIC | Age: 81
End: 2021-03-02

## 2021-03-02 NOTE — TELEPHONE ENCOUNTER
orthonet injection approval sent to scanning. Patient has been notified of approval and already scheduled for injections. Refer to previous message.

## 2021-03-05 ENCOUNTER — OFFICE VISIT (OUTPATIENT)
Dept: INTERNAL MEDICINE CLINIC | Facility: CLINIC | Age: 81
End: 2021-03-05
Payer: MEDICARE

## 2021-03-05 VITALS
SYSTOLIC BLOOD PRESSURE: 114 MMHG | WEIGHT: 195 LBS | DIASTOLIC BLOOD PRESSURE: 64 MMHG | HEIGHT: 70.5 IN | TEMPERATURE: 98 F | BODY MASS INDEX: 27.61 KG/M2 | HEART RATE: 68 BPM | OXYGEN SATURATION: 97 %

## 2021-03-05 DIAGNOSIS — R35.0 BENIGN PROSTATIC HYPERPLASIA WITH URINARY FREQUENCY: ICD-10-CM

## 2021-03-05 DIAGNOSIS — Z86.010 HISTORY OF ADENOMATOUS POLYP OF COLON: ICD-10-CM

## 2021-03-05 DIAGNOSIS — I70.0 ATHEROSCLEROSIS OF AORTA (HCC): ICD-10-CM

## 2021-03-05 DIAGNOSIS — G62.9 PERIPHERAL POLYNEUROPATHY: ICD-10-CM

## 2021-03-05 DIAGNOSIS — M15.9 PRIMARY OSTEOARTHRITIS INVOLVING MULTIPLE JOINTS: ICD-10-CM

## 2021-03-05 DIAGNOSIS — I25.10 ASHD (ARTERIOSCLEROTIC HEART DISEASE): Primary | ICD-10-CM

## 2021-03-05 DIAGNOSIS — K57.30 DIVERTICULOSIS OF COLON: ICD-10-CM

## 2021-03-05 DIAGNOSIS — N40.0 BENIGN NON-NODULAR PROSTATIC HYPERPLASIA WITHOUT LOWER URINARY TRACT SYMPTOMS: ICD-10-CM

## 2021-03-05 DIAGNOSIS — E78.00 HYPERCHOLESTEROLEMIA: ICD-10-CM

## 2021-03-05 DIAGNOSIS — R53.83 FATIGUE, UNSPECIFIED TYPE: ICD-10-CM

## 2021-03-05 DIAGNOSIS — R97.20 ELEVATED PSA: ICD-10-CM

## 2021-03-05 DIAGNOSIS — R74.8 ELEVATED CPK: ICD-10-CM

## 2021-03-05 DIAGNOSIS — R73.01 ABNORMAL FASTING GLUCOSE: ICD-10-CM

## 2021-03-05 DIAGNOSIS — N40.1 BENIGN PROSTATIC HYPERPLASIA WITH URINARY FREQUENCY: ICD-10-CM

## 2021-03-05 DIAGNOSIS — M48.061 SPINAL STENOSIS OF LUMBAR REGION WITHOUT NEUROGENIC CLAUDICATION: ICD-10-CM

## 2021-03-05 PROCEDURE — 99214 OFFICE O/P EST MOD 30 MIN: CPT | Performed by: INTERNAL MEDICINE

## 2021-03-05 PROCEDURE — 3078F DIAST BP <80 MM HG: CPT | Performed by: INTERNAL MEDICINE

## 2021-03-05 PROCEDURE — 3008F BODY MASS INDEX DOCD: CPT | Performed by: INTERNAL MEDICINE

## 2021-03-05 PROCEDURE — 3074F SYST BP LT 130 MM HG: CPT | Performed by: INTERNAL MEDICINE

## 2021-03-05 NOTE — PATIENT INSTRUCTIONS
1.  Patient is to continue his current diet, medication and activity. 2.  I have encouraged the patient to consider getting the Covid vaccine when it becomes available.   3.  I have placed an order in the system for the patient have a lipid panel, AST and

## 2021-03-05 NOTE — PROGRESS NOTES
Dustin Grant is a 68year old male. Patient presents with:  Checkup: He is concerned that a lipid panel was not run when he had labs done on 3/1/21. He was started on Repatha injections by Dr. Finn Longoria.    Ashd  Hyperlipidemia  Arthritis    HPI:   Patient pres LORAZEPAM 0.5 MG Oral Tab TAKE 1 TABLET BY MOUTH EVERY NIGHT AT BEDTIME 90 tablet 1   • FINASTERIDE 5 MG Oral Tab TAKE 1 TABLET(5 MG) BY MOUTH DAILY 90 tablet 3   • Omega-3 Fatty Acids (OMEGA-3 EPA FISH OIL OR) 1500MG, DHA 750MG orally daily      • Choleca No abdominal pain, nausea, vomiting, diarrhea, or constipation  :No Urinary complaints  EXT:No complaints of pain or swelling in patient's legs    EXAM:   /64 (BP Location: Right arm, Patient Position: Sitting, Cuff Size: large)   Pulse 68   Temp 9 Future  - ALT (SGPT); Future    3. Atherosclerosis of aorta (HCC)  Stable. CPM.  As above. 4. Abnormal fasting glucose  Stable. CPM.  I will follow up on this in the future. 5. Diverticulosis of colon  Stable.   CPM.    6. Primary osteoarthritis inv

## 2021-03-08 ENCOUNTER — HOSPITAL ENCOUNTER (OUTPATIENT)
Dept: ULTRASOUND IMAGING | Facility: HOSPITAL | Age: 81
Discharge: HOME | End: 2021-03-08
Attending: UROLOGY
Payer: MEDICARE

## 2021-03-08 ENCOUNTER — APPOINTMENT (OUTPATIENT)
Dept: LAB | Facility: HOSPITAL | Age: 81
End: 2021-03-08
Attending: UROLOGY
Payer: MEDICARE

## 2021-03-08 ENCOUNTER — LAB ENCOUNTER (OUTPATIENT)
Dept: LAB | Age: 81
End: 2021-03-08
Attending: UROLOGY
Payer: MEDICARE

## 2021-03-08 DIAGNOSIS — E78.00 HYPERCHOLESTEROLEMIA: ICD-10-CM

## 2021-03-08 DIAGNOSIS — N28.1 RENAL CYST: ICD-10-CM

## 2021-03-08 DIAGNOSIS — I25.10 ASHD (ARTERIOSCLEROTIC HEART DISEASE): ICD-10-CM

## 2021-03-08 DIAGNOSIS — R74.8 ELEVATED CPK: ICD-10-CM

## 2021-03-08 LAB
ALT SERPL-CCNC: 35 U/L
AST SERPL-CCNC: 22 U/L (ref 15–37)
CHOLEST SMN-MCNC: 174 MG/DL (ref ?–200)
CK SERPL-CCNC: 235 U/L
HDLC SERPL-MCNC: 65 MG/DL (ref 40–59)
LDLC SERPL CALC-MCNC: 99 MG/DL (ref ?–100)
NONHDLC SERPL-MCNC: 109 MG/DL (ref ?–130)
PATIENT FASTING Y/N/NP: YES
TRIGL SERPL-MCNC: 48 MG/DL (ref 30–149)
VLDLC SERPL CALC-MCNC: 10 MG/DL (ref 0–30)

## 2021-03-08 PROCEDURE — 76775 US EXAM ABDO BACK WALL LIM: CPT | Performed by: UROLOGY

## 2021-03-08 PROCEDURE — 82550 ASSAY OF CK (CPK): CPT

## 2021-03-08 PROCEDURE — 84460 ALANINE AMINO (ALT) (SGPT): CPT

## 2021-03-08 PROCEDURE — 36415 COLL VENOUS BLD VENIPUNCTURE: CPT

## 2021-03-08 PROCEDURE — 80061 LIPID PANEL: CPT

## 2021-03-08 PROCEDURE — 84450 TRANSFERASE (AST) (SGOT): CPT

## 2021-03-09 DIAGNOSIS — Z23 NEED FOR VACCINATION: ICD-10-CM

## 2021-03-10 ENCOUNTER — OFFICE VISIT (OUTPATIENT)
Dept: SURGERY | Facility: CLINIC | Age: 81
End: 2021-03-10
Payer: MEDICARE

## 2021-03-10 VITALS
SYSTOLIC BLOOD PRESSURE: 119 MMHG | BODY MASS INDEX: 27.92 KG/M2 | HEIGHT: 70 IN | HEART RATE: 75 BPM | WEIGHT: 195 LBS | DIASTOLIC BLOOD PRESSURE: 73 MMHG

## 2021-03-10 DIAGNOSIS — Z87.448 HISTORY OF HEMATURIA: ICD-10-CM

## 2021-03-10 DIAGNOSIS — Z87.898 HISTORY OF DYSURIA: ICD-10-CM

## 2021-03-10 DIAGNOSIS — Z79.82 LONG TERM CURRENT USE OF ASPIRIN: ICD-10-CM

## 2021-03-10 DIAGNOSIS — N20.0 KIDNEY STONE: ICD-10-CM

## 2021-03-10 DIAGNOSIS — N52.01 ERECTILE DYSFUNCTION DUE TO ARTERIAL INSUFFICIENCY: ICD-10-CM

## 2021-03-10 DIAGNOSIS — N40.1 BENIGN PROSTATIC HYPERPLASIA WITH URINARY FREQUENCY: Primary | ICD-10-CM

## 2021-03-10 DIAGNOSIS — N28.1 RENAL CYST: ICD-10-CM

## 2021-03-10 DIAGNOSIS — N48.6 PEYRONIE'S DISEASE: ICD-10-CM

## 2021-03-10 DIAGNOSIS — R35.0 BENIGN PROSTATIC HYPERPLASIA WITH URINARY FREQUENCY: Primary | ICD-10-CM

## 2021-03-10 DIAGNOSIS — R97.20 ELEVATED PSA: ICD-10-CM

## 2021-03-10 DIAGNOSIS — R35.1 NOCTURIA: ICD-10-CM

## 2021-03-10 PROCEDURE — 3008F BODY MASS INDEX DOCD: CPT | Performed by: UROLOGY

## 2021-03-10 PROCEDURE — 3078F DIAST BP <80 MM HG: CPT | Performed by: UROLOGY

## 2021-03-10 PROCEDURE — 3074F SYST BP LT 130 MM HG: CPT | Performed by: UROLOGY

## 2021-03-10 PROCEDURE — 99214 OFFICE O/P EST MOD 30 MIN: CPT | Performed by: UROLOGY

## 2021-03-10 NOTE — PATIENT INSTRUCTIONS
Sukhdev Gallegos M.D.      1.   Continue tamsulosin 0.4 mg daily    2. Please continue finasteride 5 mg daily    3.   You have chosen to observe and not surgically treat the calculus reported to be in the left kidne screening; testosterone--free and total; urinalysis urine test

## 2021-03-10 NOTE — PROGRESS NOTES
HPI:    Patient ID: Dustin Grant is a 68year old male. HPI     Elevated PSA  Chronic. Problem started 2002. Most recent 03/01/2021 PSA = 1.92 x 2 = 3.84 (adjusted for finasteride).  Patient presently denies any associated symptoms to suggest prostate ca denies any associated pain. The patient feels this is stable.     Voiding Dysfunction  Chronic.  Patient has current IPSS score of 3.5, mild voiding dysfunction category, slightly worse compared to previous score of 0, no voiding dysfunction category, on 11 fariha.  Patient admits to history of enlarged prostate and that at one point in time, was on finasteride for a year or more, but ended up stopping it because of side effects.  Father diagnosed with prostate cancer at 80.  Patient denies history of smoking of Bactrim DS generic = trimethoprim–sulfamethoxazole twice daily ordered by Dr. Amanda Sands; submit urine specimen for complete urinalysis and urine culture about 1 week after you finish all antibiotics;  Continue tamsulosin 0.4 mg daily; Continue finasterid % Nasal Solution SPRAY ONE SPRAY IN EACH NOSTRIL TWICE DAILY IN EACH NOSTRIL AS NEEDED 30 mL 3   • melatonin 3 MG Oral Tab Take 6 mg by mouth nightly. • loratadine 10 MG Oral Tab Take 10 mg by mouth daily as needed for Allergies.      • aspirin 81 MG urinate again less than 2 hours after you finished urinating?: Less than 1 time in 5  Over the past month, how often have you found that you stopped and started again several times when you urinated?: Less than 1 time in 5  Over the past month, how often h = 0-2; Bacteria = none seen   10/28/2020 Creatinine = 0.97; GFR = 75; UA blood = negative; protein = negative; Microscopic not indicated   10/14/2020 PSA = 2.61 x 2 = 5.22 (adjusted for finasteride);  Urine cytology = negative for high-grade urothelial carc encounter.       ASSESSMENT/PLAN:   Kidney stone  Benign prostatic hyperplasia with urinary frequency  (primary encounter diagnosis)  Erectile dysfunction due to arterial insufficiency  Peyronie's disease  Renal cyst  Elevated psa  Nocturia  History of dysu recent 03/08/2021 US KIDNEYS = Enlarged prostate protrudes into bladder trigone; Prostate measures 6.9 x 5.2 x 4.9 cm. He is currently taking finasteride 5 mg daily; started 08/01/2018; denies side effects.  Discussed continuing long term finasteride vs Francis of dysuria   Resolved. He states that for 5 days in both 09/2020 and 10/2020 he experienced drops of blood in urine; patient states this occurred at night; he is unaware of anything that makes this worse. He states his dysuria was worse at night.  Patient b berries. Also the official recommendation is to drink 2.5 liters of water and lemonade ( 8 cups! )  per day. Lemonade is high in the natural chemical citrate  which in general lowers probability of forming kidney stones.   Your dentist may be upset with y and discharge instructions (if applicable) and agree that the record reflects my personal performance and is accurate and complete.   Andra White MD, 3/10/2021, 4:51 PM

## 2021-03-18 ENCOUNTER — OFFICE VISIT (OUTPATIENT)
Dept: SURGERY | Facility: CLINIC | Age: 81
End: 2021-03-18

## 2021-03-18 DIAGNOSIS — M47.816 LUMBAR SPONDYLOSIS: Primary | ICD-10-CM

## 2021-03-18 PROCEDURE — 64493 INJ PARAVERT F JNT L/S 1 LEV: CPT | Performed by: PHYSICAL MEDICINE & REHABILITATION

## 2021-03-18 PROCEDURE — 64495 INJ PARAVERT F JNT L/S 3 LEV: CPT | Performed by: PHYSICAL MEDICINE & REHABILITATION

## 2021-03-18 PROCEDURE — 64494 INJ PARAVERT F JNT L/S 2 LEV: CPT | Performed by: PHYSICAL MEDICINE & REHABILITATION

## 2021-03-23 NOTE — TELEPHONE ENCOUNTER
Telephone call to pt and lab results discussed. Pt's CPK is normal at 235. Cholesterol readings are much better with Repatha. I will place orders in the system for pt to have blood teste  And U/A for his annual physical in 6 months.

## 2021-03-24 ENCOUNTER — TELEPHONE (OUTPATIENT)
Dept: INTERNAL MEDICINE CLINIC | Facility: CLINIC | Age: 81
End: 2021-03-24

## 2021-03-25 RX ORDER — LORAZEPAM 0.5 MG/1
TABLET ORAL
Qty: 90 TABLET | Refills: 1 | Status: SHIPPED | OUTPATIENT
Start: 2021-03-25 | End: 2021-10-07

## 2021-03-25 NOTE — TELEPHONE ENCOUNTER
To MD:  The above refill request is for a controlled substance. Please review pended medication order. Print and sign for staff to fax to pharmacy or prescribe electronically.     Last office visit: 3/5/2021  Last time refill sent and quantity/refills: 9

## 2021-04-06 ENCOUNTER — OFFICE VISIT (OUTPATIENT)
Dept: NEUROLOGY | Facility: CLINIC | Age: 81
End: 2021-04-06
Payer: MEDICARE

## 2021-04-06 VITALS — HEIGHT: 70 IN | BODY MASS INDEX: 27.92 KG/M2 | WEIGHT: 195 LBS

## 2021-04-06 DIAGNOSIS — I25.10 ASHD (ARTERIOSCLEROTIC HEART DISEASE): ICD-10-CM

## 2021-04-06 DIAGNOSIS — M15.9 PRIMARY OSTEOARTHRITIS INVOLVING MULTIPLE JOINTS: ICD-10-CM

## 2021-04-06 DIAGNOSIS — M47.816 LUMBAR SPONDYLOSIS: Primary | ICD-10-CM

## 2021-04-06 DIAGNOSIS — N40.0 BENIGN NON-NODULAR PROSTATIC HYPERPLASIA WITHOUT LOWER URINARY TRACT SYMPTOMS: ICD-10-CM

## 2021-04-06 DIAGNOSIS — M96.1 POSTLAMINECTOMY SYNDROME OF LUMBAR REGION: ICD-10-CM

## 2021-04-06 PROCEDURE — 99213 OFFICE O/P EST LOW 20 MIN: CPT | Performed by: PHYSICAL MEDICINE & REHABILITATION

## 2021-04-06 PROCEDURE — 3008F BODY MASS INDEX DOCD: CPT | Performed by: PHYSICAL MEDICINE & REHABILITATION

## 2021-04-06 NOTE — PROGRESS NOTES
[address]  Progress Note    CHIEF COMPLAINT:  Patient presents with:  Low Back Pain: Patient presents for post bilateral L2, L3, L4 and L5 MBB, patient reports that he is unsure how much relief he received Smoking status: Never Smoker      Smokeless tobacco: Never Used    Vaping Use      Vaping Use: Never used    Substance and Sexual Activity      Alcohol use:  Yes        Alcohol/week: 2.0 standard drinks        Types: 2 Glasses of wine per week      Drug use ACTIVITY. DO NO REPEAT ON THE SAME DAY.  DO NOT TAKE WITHIN 4 HOURS OF TAMSULOSIN (Patient not taking: Reported on 4/6/2021) 10 tablet 0       ALLERGIES:     Cat Hair Extract        Runny nose  Dust Mites              Runny nose  Mold Spores             Run situation. He will keep active, avoid extension exercises and return PRN. - PHYSICAL THERAPY EXTERNAL    2. Postlaminectomy syndrome of lumbar region  As above    3.  ASHD (arteriosclerotic heart disease)  NSAIDs contraindicated due to coronary artery dis

## 2021-04-13 ENCOUNTER — LAB REQUISITION (OUTPATIENT)
Dept: LAB | Facility: HOSPITAL | Age: 81
End: 2021-04-13
Payer: MEDICARE

## 2021-04-13 DIAGNOSIS — Z01.818 ENCOUNTER FOR OTHER PREPROCEDURAL EXAMINATION: ICD-10-CM

## 2021-05-06 NOTE — TELEPHONE ENCOUNTER
Dr. Han Andino referral authorized, this was relayed to [facility] who verbalized understanding. She will call managed care if needed - phone number relayed. Dr. Tena Hemphill PT referral faxed, per Gladys's request to [phone], fax confirmation received.

## 2021-05-06 NOTE — TELEPHONE ENCOUNTER
[facility] called     Due to insurance physical therapy script, originally from Dr Lilia Marion 4-6, must be from Dr Valentina Cruz      Please send to [address] in [address], fax# [phone]

## 2021-05-19 ENCOUNTER — LAB ENCOUNTER (OUTPATIENT)
Dept: LAB | Facility: HOSPITAL | Age: 81
End: 2021-05-19
Attending: INTERNAL MEDICINE
Payer: MEDICARE

## 2021-05-19 ENCOUNTER — TELEPHONE (OUTPATIENT)
Dept: INTERNAL MEDICINE CLINIC | Facility: CLINIC | Age: 81
End: 2021-05-19

## 2021-05-19 DIAGNOSIS — R53.83 FATIGUE, UNSPECIFIED TYPE: ICD-10-CM

## 2021-05-19 DIAGNOSIS — R53.83 FATIGUE, UNSPECIFIED TYPE: Primary | ICD-10-CM

## 2021-05-19 NOTE — TELEPHONE ENCOUNTER
Pt. Isn't feeling well his temp is fluctuating between 97-99, and he feels sluggish his wife is due to have surgery on 5/25 he wants to make sure he dosen't interrupt her surgery please advise pt. Would like to see Dr. Clive Hernández today he has no openings ph.  # [phone]

## 2021-05-19 NOTE — TELEPHONE ENCOUNTER
Spoke with patient and he states that 2 days ago he felt \" a little off\". He went to PT and was fine while there, but he felt exhausted afterwards. He took a 2 hour nap yesterday. Today he woke up feeling \"sluggish\". In general he has no energy.  He fe

## 2021-05-20 ENCOUNTER — TELEPHONE (OUTPATIENT)
Dept: INTERNAL MEDICINE CLINIC | Facility: CLINIC | Age: 81
End: 2021-05-20

## 2021-05-20 DIAGNOSIS — U07.1 COVID-19 VIRUS INFECTION: Primary | ICD-10-CM

## 2021-05-20 NOTE — TELEPHONE ENCOUNTER
Pt had +fatigue on Mon, 5/17; no fever; no anosmia; on cough    Nares COVID on 5/19/21 was positive; neither pt or wife had COVID vaccine    Pt lives with wife Gaspar Lesches; Rowena Cowboy x 10 days, or until 5/29/21    Pt is a candidate for polyclonal antibody i

## 2021-05-20 NOTE — TELEPHONE ENCOUNTER
Telephone call to patient and situation discussed. At this time I recommend to the patient that he have the Covid nasal swab as he has already done. We will await that result.

## 2021-05-20 NOTE — TELEPHONE ENCOUNTER
Patient calling for Covid test results from 5/19/2021. Was told by  that he should have results by last night.     Best call back number [phone]

## 2021-05-20 NOTE — TELEPHONE ENCOUNTER
Informed pt Covid results pending - understanding verbalized. Advised pt to monitor MyChart for results, understanding verbalized.

## 2021-05-21 ENCOUNTER — TELEPHONE (OUTPATIENT)
Dept: FAMILY MEDICINE CLINIC | Facility: CLINIC | Age: 81
End: 2021-05-21

## 2021-05-21 ENCOUNTER — TELEPHONE (OUTPATIENT)
Dept: CASE MANAGEMENT | Age: 81
End: 2021-05-21

## 2021-05-21 ENCOUNTER — NURSE ONLY (OUTPATIENT)
Dept: INFUSION CENTER | Age: 81
End: 2021-05-21
Attending: INTERNAL MEDICINE
Payer: MEDICARE

## 2021-05-21 VITALS
SYSTOLIC BLOOD PRESSURE: 90 MMHG | TEMPERATURE: 98 F | BODY MASS INDEX: 26.88 KG/M2 | OXYGEN SATURATION: 98 % | WEIGHT: 192 LBS | RESPIRATION RATE: 16 BRPM | HEART RATE: 64 BPM | HEIGHT: 71 IN | DIASTOLIC BLOOD PRESSURE: 56 MMHG

## 2021-05-21 NOTE — TELEPHONE ENCOUNTER
Spoke with pt for condition update. Tested COVID + on 5/19. Reports his main sx is feeling lethargic. Denies SOB, fever, cough, chest pain, or sore throat. Pt has order for PAB infusion. States he plans to get infusion and is awaiting call to schedule.

## 2021-05-21 NOTE — TELEPHONE ENCOUNTER
Referral for antibody infusion received from Dr Zunilda Christopher. Ab infusion scheduled for patient at [facility] for today at 12:30. Advised patient to arrive 15 minutes prior to appt and to call Registration Hotline at [phone] once they arrive.   Jane Stratton

## 2021-05-21 NOTE — PROGRESS NOTES
1245  Pt arrived for infusion. Education given on IV start and medication infusion. Pt resting on cart without distress. 1326  Infusion complete. Line flushed with Normal Saline. Patient tolerated well. Will continue to monitor.  Offered pt water, pt dolores

## 2021-05-21 NOTE — PATIENT INSTRUCTIONS
FACT SHEET FOR PATIENTS, PARENTS AND CAREGIVERS   EMERGENCY USE AUTHORIZATION (EUA) OF REGEN-COVTM    (casirivimab with imdevimab) FOR CORONAVIRUS DISEASE 2019 (COVID-19)      You are being given a medicine called REGEN-COV (casirivimab with imdevimab) f COVID-19 symptoms or the need for hospitalization. REGEN-COV is investigational because it is still being studied. There is limited information known about the safety and effectiveness of using REGEN-COV to treat people with COVID-19.        The FDA has aut reactions may be severe or life threatening. • Worsening symptoms after treatment: You may experience new or worsening symptoms after infusion, including fever, difficulty breathing, rapid or slow heart rate, tiredness, weakness or confusion.  If these oc women or breastfeeding mothers with REGEN-COV (casirivimab with imdevimab). For a mother and unborn baby, the benefit of receiving REGEN-COV may be greater than the risk from the treatment.  If you are pregnant or breastfeeding, discuss your options and spe these products, unless terminated or revoked (after which the products may no longer be used).          Manufactured by:   [id number], [facility]      [facility]

## 2021-05-21 NOTE — TELEPHONE ENCOUNTER
Delia 88 phone call to patient and situation discussed. I reiterated to the patient with Dr. Darrel Oliveira as told him. I have told the patient that there is no role for hydroxychloroquine for Covid.   I have encouraged him to get the monoclonal/polyclonal antibod

## 2021-05-24 ENCOUNTER — TELEPHONE (OUTPATIENT)
Dept: INTERNAL MEDICINE CLINIC | Facility: CLINIC | Age: 81
End: 2021-05-24

## 2021-05-24 NOTE — TELEPHONE ENCOUNTER
Pt had PAB infusion on 5/21/21 at [address] [place] [address] for COVID-19. Please f/u with pt for post-infusion assessment and home monitoring. Thank you!

## 2021-05-24 NOTE — TELEPHONE ENCOUNTER
Spoke to pt for day #4 of 7 home monitoring. Pt reports overall feeling much better     Home Monitoring Condition Update    Covid19+ test date: 5/19/21    Consent Verification:  Assessment Completed With: Patient  HIPAA Verified?   Yes    [address]

## 2021-05-25 NOTE — TELEPHONE ENCOUNTER
To nursing,  I see that pt told you:  \"Dr. Leti Walsh is an internet doctor that he found online and he ordered a nebulizer from him. He uses a saline solution and small amount of H2O2 and iodine in the mixture. He has used this for 2-3 days. \"    Tell him t

## 2021-05-25 NOTE — TELEPHONE ENCOUNTER
Attempted to call patient to complete Covid Home Monitoring. No answer and no voicemail. Please follow up later on today.

## 2021-05-25 NOTE — TELEPHONE ENCOUNTER
Home Monitoring Condition Update    Covid19+ test date: 5/19/21     Consent Verification:  Assessment Completed With: Patient  HIPAA Verified?   Yes    COVID-19 HOME MONITORING 5/25/2021   Temperature 97.3   Reading From Mouth   SPO2 96   Pulse 74   Respira questions/concerns regarding patient status     Nurse Interventions: He is feeling better and stronger. He will quarantine until May 29. Patient was instructed to continue to monitor his symptoms.  He was instructed to go to the ER with worsening symptoms,

## 2021-05-26 NOTE — TELEPHONE ENCOUNTER
Day # 6 of 7 home monitoring     Home Monitoring Condition Update    Covid19+ test date: 5/19/21    Consent Verification:  Assessment Completed With: Patient  HIPAA Verified?   Yes    COVID-19 HOME MONITORING 5/26/2021   Temperature 97.5   Reading From [facility]

## 2021-05-27 NOTE — TELEPHONE ENCOUNTER
Telephone call to patient and situation discussed. Patient feels that he is improving every day. I told patient that I expect him to continue to improve on a regular basis. Patient will let me know if he has more problems.   Patient notes that when he tr

## 2021-05-27 NOTE — TELEPHONE ENCOUNTER
Final condition update: Patient reports continued fatigue with activity. States \"I get fatigued easier\". Patient felt fatigued after working outside in the lawn (moving things around and watering the grass). In addition, he felt his heart racing.  States No  • Do you have any questions about your patient instructions to self isolate? No. Patient expressed that he was instructed to isolate for 10 days. Isolation will end 5/29/21  o Are there any barriers to following these instructions?  No  o Do you need as

## 2021-06-07 ENCOUNTER — MED REC SCAN ONLY (OUTPATIENT)
Dept: NEUROLOGY | Facility: CLINIC | Age: 81
End: 2021-06-07

## 2021-07-07 RX ORDER — FINASTERIDE 5 MG/1
TABLET, FILM COATED ORAL
Qty: 90 TABLET | Refills: 3 | Status: SHIPPED | OUTPATIENT
Start: 2021-07-07

## 2021-07-07 NOTE — TELEPHONE ENCOUNTER
Received refill request for finasteride 5 mg  Patient's LOV within 12 month  Medication meets refill criteria protocol, med approved    Benign Prostatic Hypertrophy Medications      Protocol Criteria:  • Appointment scheduled in the past 12 months or in th

## 2021-07-16 ENCOUNTER — TELEPHONE (OUTPATIENT)
Dept: INTERNAL MEDICINE CLINIC | Facility: CLINIC | Age: 81
End: 2021-07-16

## 2021-07-16 ENCOUNTER — APPOINTMENT (OUTPATIENT)
Dept: CT IMAGING | Facility: HOSPITAL | Age: 81
End: 2021-07-16
Attending: EMERGENCY MEDICINE
Payer: MEDICARE

## 2021-07-16 ENCOUNTER — HOSPITAL ENCOUNTER (EMERGENCY)
Facility: HOSPITAL | Age: 81
Discharge: HOME | End: 2021-07-16
Attending: EMERGENCY MEDICINE
Payer: MEDICARE

## 2021-07-16 ENCOUNTER — APPOINTMENT (OUTPATIENT)
Dept: GENERAL RADIOLOGY | Facility: HOSPITAL | Age: 81
End: 2021-07-16
Attending: EMERGENCY MEDICINE
Payer: MEDICARE

## 2021-07-16 ENCOUNTER — HOSPITAL ENCOUNTER (OUTPATIENT)
Age: 81
Discharge: ANOTHER HOSPITAL | End: 2021-07-16
Payer: MEDICARE

## 2021-07-16 VITALS
HEART RATE: 63 BPM | TEMPERATURE: 99 F | HEIGHT: 71 IN | SYSTOLIC BLOOD PRESSURE: 117 MMHG | RESPIRATION RATE: 16 BRPM | BODY MASS INDEX: 26.88 KG/M2 | WEIGHT: 192 LBS | DIASTOLIC BLOOD PRESSURE: 86 MMHG | OXYGEN SATURATION: 97 %

## 2021-07-16 VITALS
OXYGEN SATURATION: 97 % | SYSTOLIC BLOOD PRESSURE: 129 MMHG | RESPIRATION RATE: 18 BRPM | HEART RATE: 89 BPM | DIASTOLIC BLOOD PRESSURE: 80 MMHG | TEMPERATURE: 98 F

## 2021-07-16 DIAGNOSIS — R06.02 SHORTNESS OF BREATH: Primary | ICD-10-CM

## 2021-07-16 DIAGNOSIS — R06.00 DYSPNEA, UNSPECIFIED TYPE: ICD-10-CM

## 2021-07-16 DIAGNOSIS — R07.9 CHEST PAIN OF UNCERTAIN ETIOLOGY: Primary | ICD-10-CM

## 2021-07-16 LAB
ANION GAP SERPL CALC-SCNC: 4 MMOL/L (ref 0–18)
BASOPHILS # BLD AUTO: 0.05 X10(3) UL (ref 0–0.2)
BASOPHILS NFR BLD AUTO: 0.8 %
BUN BLD-MCNC: 14 MG/DL (ref 7–18)
BUN/CREAT SERPL: 15.1 (ref 10–20)
CALCIUM BLD-MCNC: 9.4 MG/DL (ref 8.5–10.1)
CHLORIDE SERPL-SCNC: 111 MMOL/L (ref 98–112)
CO2 SERPL-SCNC: 27 MMOL/L (ref 21–32)
CREAT BLD-MCNC: 0.93 MG/DL
D DIMER PPP FEU-MCNC: 1.06 UG/ML FEU (ref ?–0.78)
DEPRECATED RDW RBC AUTO: 47.3 FL (ref 35.1–46.3)
EOSINOPHIL # BLD AUTO: 0.2 X10(3) UL (ref 0–0.7)
EOSINOPHIL NFR BLD AUTO: 3 %
ERYTHROCYTE [DISTWIDTH] IN BLOOD BY AUTOMATED COUNT: 13.2 % (ref 11–15)
GLUCOSE BLD-MCNC: 101 MG/DL (ref 70–99)
HCT VFR BLD AUTO: 39.5 %
HGB BLD-MCNC: 12.9 G/DL
IMM GRANULOCYTES # BLD AUTO: 0.03 X10(3) UL (ref 0–1)
IMM GRANULOCYTES NFR BLD: 0.5 %
LYMPHOCYTES # BLD AUTO: 1.44 X10(3) UL (ref 1–4)
LYMPHOCYTES NFR BLD AUTO: 21.6 %
MCH RBC QN AUTO: 32.2 PG (ref 26–34)
MCHC RBC AUTO-ENTMCNC: 32.7 G/DL (ref 31–37)
MCV RBC AUTO: 98.5 FL
MONOCYTES # BLD AUTO: 0.62 X10(3) UL (ref 0.1–1)
MONOCYTES NFR BLD AUTO: 9.3 %
NEUTROPHILS # BLD AUTO: 4.32 X10 (3) UL (ref 1.5–7.7)
NEUTROPHILS # BLD AUTO: 4.32 X10(3) UL (ref 1.5–7.7)
NEUTROPHILS NFR BLD AUTO: 64.8 %
NT-PROBNP SERPL-MCNC: 190 PG/ML (ref ?–450)
OSMOLALITY SERPL CALC.SUM OF ELEC: 295 MOSM/KG (ref 275–295)
PLATELET # BLD AUTO: 205 10(3)UL (ref 150–450)
POTASSIUM SERPL-SCNC: 4.2 MMOL/L (ref 3.5–5.1)
RBC # BLD AUTO: 4.01 X10(6)UL
SODIUM SERPL-SCNC: 142 MMOL/L (ref 136–145)
TROPONIN I SERPL-MCNC: <0.045 NG/ML (ref ?–0.04)
WBC # BLD AUTO: 6.7 X10(3) UL (ref 4–11)

## 2021-07-16 PROCEDURE — 99284 EMERGENCY DEPT VISIT MOD MDM: CPT

## 2021-07-16 PROCEDURE — 36415 COLL VENOUS BLD VENIPUNCTURE: CPT

## 2021-07-16 PROCEDURE — 99205 OFFICE O/P NEW HI 60 MIN: CPT | Performed by: NURSE PRACTITIONER

## 2021-07-16 PROCEDURE — 93005 ELECTROCARDIOGRAM TRACING: CPT

## 2021-07-16 PROCEDURE — 85379 FIBRIN DEGRADATION QUANT: CPT | Performed by: EMERGENCY MEDICINE

## 2021-07-16 PROCEDURE — 71045 X-RAY EXAM CHEST 1 VIEW: CPT | Performed by: EMERGENCY MEDICINE

## 2021-07-16 PROCEDURE — 83880 ASSAY OF NATRIURETIC PEPTIDE: CPT | Performed by: EMERGENCY MEDICINE

## 2021-07-16 PROCEDURE — 71260 CT THORAX DX C+: CPT | Performed by: EMERGENCY MEDICINE

## 2021-07-16 PROCEDURE — 80048 BASIC METABOLIC PNL TOTAL CA: CPT | Performed by: EMERGENCY MEDICINE

## 2021-07-16 PROCEDURE — 93000 ELECTROCARDIOGRAM COMPLETE: CPT | Performed by: NURSE PRACTITIONER

## 2021-07-16 PROCEDURE — 85025 COMPLETE CBC W/AUTO DIFF WBC: CPT | Performed by: EMERGENCY MEDICINE

## 2021-07-16 PROCEDURE — 93010 ELECTROCARDIOGRAM REPORT: CPT | Performed by: EMERGENCY MEDICINE

## 2021-07-16 PROCEDURE — 84484 ASSAY OF TROPONIN QUANT: CPT | Performed by: EMERGENCY MEDICINE

## 2021-07-16 NOTE — TELEPHONE ENCOUNTER
Noted. Patient went to urgent care for evaluation this morning. Patient was then sent to the emergency room where he is now in the process of being evaluated.

## 2021-07-16 NOTE — ED PROVIDER NOTES
Patient Seen in: [facility]      History   Patient presents with:  Difficulty Breathing    Stated Complaint: not able to take a full breath/today worse/eyes not focusing/HR fast    HPI/Subjective:   Delia oHdgson is a 31-year-old male with his History    Tobacco Use      Smoking status: Never Smoker      Smokeless tobacco: Never Used    Vaping Use      Vaping Use: Never used    Alcohol use: Yes      Alcohol/week: 2.0 standard drinks      Types: 2 Glasses of wine per week    Drug use:  No Pulses: Normal pulses. Heart sounds: Normal heart sounds. Pulmonary:      Effort: Pulmonary effort is normal.      Breath sounds: Normal breath sounds. No decreased breath sounds, wheezing, rhonchi or rales.    Abdominal:      General: Bowel cullen type     Disposition: Ic to ed  7/16/2021 11:57 am    Follow-up:  No follow-up provider specified.         Medications Prescribed:  Discharge Medication List as of 7/16/2021 12:07 PM

## 2021-07-16 NOTE — TELEPHONE ENCOUNTER
Patient is calling 7 - 10 days ago patient started to develop trouble breathing  This morning he woke the breathing is worse, his heart rate is up, a little light headed  It is all in the upper chest, eye sight feels strange    Patient feels he may need a

## 2021-07-16 NOTE — TELEPHONE ENCOUNTER
Spoke with patient who reports he noticed abnormalities with his breathing about a week ago. He states his HR is elevated with minimal exertion. He just got back from going to the bathroom and his HR is 115 and O2 sats are at 97%.  He feels he gets easily f

## 2021-07-16 NOTE — ED INITIAL ASSESSMENT (HPI)
C/o difficulty breathing unable to take a \"full breath\" for a week   denies chest pain  C/o feeling slightly lightheaded for 1-2 hors Sx resolved  Took pulse earlier Hr 110

## 2021-07-16 NOTE — ED PROVIDER NOTES
Patient Seen in: [facility] AND [facility] Emergency Department      History   Patient presents with:  Chest Pain Angina    Stated Complaint: diffficulty breathing sent from [facility]    HPI/Subjective:   HPI    16-year-old male with past medical history significan diffficulty breathing sent from [facility]  Other systems are as noted in HPI. Constitutional and vital signs reviewed. All other systems reviewed and negative except as noted above.     Physical Exam     ED Triage Vitals [07/16/21 1236]   /80   Pul With Platelet.   Procedure                               Abnormality         Status                     ---------                               -----------         ------                     CBC W/ DIFFERENTIAL[[id number]]          Abnormal            Final [id number]  [phone]          [facility], [address] [place] [id number]  [phone]    Call in 2 days      We recommend that you schedule follow up care with a primary care provider within the next three months to obtain basic h

## 2021-07-19 NOTE — TELEPHONE ENCOUNTER
Patient was called per request below. Patient states he is feeling much better and is completely fine now. Patient was informed that he should schedule a follow up with Dr Alo Awad.  Patient scheduled for this Wednesday, 7/21/2021 at 10:30am.

## 2021-07-21 ENCOUNTER — OFFICE VISIT (OUTPATIENT)
Dept: INTERNAL MEDICINE CLINIC | Facility: CLINIC | Age: 81
End: 2021-07-21
Payer: MEDICARE

## 2021-07-21 VITALS
SYSTOLIC BLOOD PRESSURE: 106 MMHG | HEIGHT: 71 IN | DIASTOLIC BLOOD PRESSURE: 66 MMHG | OXYGEN SATURATION: 95 % | HEART RATE: 68 BPM | WEIGHT: 192.69 LBS | TEMPERATURE: 98 F | RESPIRATION RATE: 16 BRPM | BODY MASS INDEX: 26.98 KG/M2

## 2021-07-21 DIAGNOSIS — R53.83 FATIGUE, UNSPECIFIED TYPE: ICD-10-CM

## 2021-07-21 DIAGNOSIS — R73.01 ABNORMAL FASTING GLUCOSE: ICD-10-CM

## 2021-07-21 DIAGNOSIS — M15.9 PRIMARY OSTEOARTHRITIS INVOLVING MULTIPLE JOINTS: ICD-10-CM

## 2021-07-21 DIAGNOSIS — E78.00 HYPERCHOLESTEROLEMIA: ICD-10-CM

## 2021-07-21 DIAGNOSIS — I25.10 ASHD (ARTERIOSCLEROTIC HEART DISEASE): Primary | ICD-10-CM

## 2021-07-21 PROCEDURE — 3074F SYST BP LT 130 MM HG: CPT | Performed by: INTERNAL MEDICINE

## 2021-07-21 PROCEDURE — 3008F BODY MASS INDEX DOCD: CPT | Performed by: INTERNAL MEDICINE

## 2021-07-21 PROCEDURE — 3078F DIAST BP <80 MM HG: CPT | Performed by: INTERNAL MEDICINE

## 2021-07-21 PROCEDURE — 99214 OFFICE O/P EST MOD 30 MIN: CPT | Performed by: INTERNAL MEDICINE

## 2021-07-21 NOTE — PATIENT INSTRUCTIONS
1.  Patient is to continue his current diet, medication and activity. 2.  I have encouraged the patient to obtain the Covid vaccine even though he has antibiotics from a previous Covid infection.   3.  I will see the patient back in September as previously

## 2021-07-21 NOTE — PROGRESS NOTES
Joyd Romero is a 68year old male. Patient presents with:  Checkup: C/O SOB with deep inspiratory effort,  seen at Urgent Care yesterday  Ashd  Hyperlipidemia  Arthritis    HPI:   Patient presents with:  Checkup: C/O SOB with deep inspiratory effort,  seen REPEAT ON THE SAME DAY. DO NOT TAKE WITHIN 4 HOURS OF TAMSULOSIN 10 tablet 0   • Omega-3 Fatty Acids (OMEGA-3 EPA FISH OIL OR) 1500MG, DHA 750MG orally daily      • Cholecalciferol (VITAMIN D-3) 5000 units Oral Tab Take 1 tablet by mouth daily.      • Alpha chest pain  GI: No abdominal pain, nausea, vomiting, diarrhea, or constipation  :No Urinary complaints  EXT:No complaints of pain or swelling in patient's legs    EXAM:   /66 (BP Location: Left arm, Patient Position: Sitting, Cuff Size: large)   Pu

## 2021-08-05 RX ORDER — TADALAFIL 20 MG/1
TABLET ORAL
Qty: 10 TABLET | Refills: 0 | Status: SHIPPED | OUTPATIENT
Start: 2021-08-05

## 2021-08-10 ENCOUNTER — LAB REQUISITION (OUTPATIENT)
Dept: SURGERY | Age: 81
End: 2021-08-10
Payer: MEDICARE

## 2021-08-10 DIAGNOSIS — Z01.818 PREOP EXAMINATION: ICD-10-CM

## 2021-08-11 LAB — SARS-COV-2 RNA RESP QL NAA+PROBE: NOT DETECTED

## 2021-09-01 ENCOUNTER — TELEPHONE (OUTPATIENT)
Dept: INTERNAL MEDICINE CLINIC | Facility: CLINIC | Age: 81
End: 2021-09-01

## 2021-09-01 DIAGNOSIS — E55.9 VITAMIN D DEFICIENCY: Primary | ICD-10-CM

## 2021-09-01 NOTE — TELEPHONE ENCOUNTER
Please advise - testosterone is ordered - vitamin D pending- to DR. DINERO  Patient needs to mention when he goes to lab that  There are 2 different dates for blood work

## 2021-09-01 NOTE — TELEPHONE ENCOUNTER
----- Message from Ayde Medellin sent at 9/1/2021  4:22 PM CDT -----  Regarding: Test Results Question  Contact: [phone]  Dr Hawk Daly:  our annual visit is next week 9/9/21, so I will take my blood test in the next day or two.  Please add to check for

## 2021-10-05 ENCOUNTER — PATIENT MESSAGE (OUTPATIENT)
Dept: INTERNAL MEDICINE CLINIC | Facility: CLINIC | Age: 81
End: 2021-10-05

## 2021-10-07 ENCOUNTER — PATIENT MESSAGE (OUTPATIENT)
Dept: INTERNAL MEDICINE CLINIC | Facility: CLINIC | Age: 81
End: 2021-10-07

## 2021-10-07 ENCOUNTER — TELEPHONE (OUTPATIENT)
Dept: INTERNAL MEDICINE CLINIC | Facility: CLINIC | Age: 81
End: 2021-10-07

## 2021-10-07 RX ORDER — LORAZEPAM 0.5 MG/1
0.5 TABLET ORAL NIGHTLY
Qty: 90 TABLET | Refills: 1 | Status: SHIPPED | OUTPATIENT
Start: 2021-10-07

## 2021-10-07 NOTE — TELEPHONE ENCOUNTER
Savannah Leiva     BM    2:13 PM  Note  Pt called for status on refill  Pt requesting refill for Lorazepam  Pt is out of medication  Pt may need NEW RX as he is changing pharmacy to [facility]  Tasked to RX               FG    2:12 PM  Shima Maier contacted [facility]

## 2021-10-07 NOTE — TELEPHONE ENCOUNTER
[facility] removed from profile. [facility] added. Control pended. To on call Dr. Joyce Yancey    To MD:  The above refill request is for a controlled substance. Please review pended medication order.    Print and sign for staff to fax to pharmacy or prescribe

## 2021-10-07 NOTE — TELEPHONE ENCOUNTER
Pt called for status on refill  Pt requesting refill for Lorazepam  Pt is out of medication  Pt may need NEW RX as he is changing pharmacy to [facility]  Tasked to [employer]

## 2021-11-10 ENCOUNTER — LAB ENCOUNTER (OUTPATIENT)
Dept: LAB | Age: 81
End: 2021-11-10
Attending: INTERNAL MEDICINE
Payer: MEDICARE

## 2021-11-10 DIAGNOSIS — R53.83 FATIGUE, UNSPECIFIED TYPE: ICD-10-CM

## 2021-11-10 DIAGNOSIS — R97.20 ELEVATED PSA: ICD-10-CM

## 2021-11-10 DIAGNOSIS — E78.00 HYPERCHOLESTEROLEMIA: ICD-10-CM

## 2021-11-10 DIAGNOSIS — E55.9 VITAMIN D DEFICIENCY: ICD-10-CM

## 2021-11-10 DIAGNOSIS — Z00.00 ANNUAL PHYSICAL EXAM: ICD-10-CM

## 2021-11-10 DIAGNOSIS — R73.01 ABNORMAL FASTING GLUCOSE: ICD-10-CM

## 2021-11-10 PROCEDURE — 82306 VITAMIN D 25 HYDROXY: CPT

## 2021-11-10 PROCEDURE — 80053 COMPREHEN METABOLIC PANEL: CPT

## 2021-11-10 PROCEDURE — 82550 ASSAY OF CK (CPK): CPT

## 2021-11-10 PROCEDURE — 80061 LIPID PANEL: CPT

## 2021-11-10 PROCEDURE — 84153 ASSAY OF PSA TOTAL: CPT

## 2021-11-10 PROCEDURE — 81001 URINALYSIS AUTO W/SCOPE: CPT | Performed by: INTERNAL MEDICINE

## 2021-11-10 PROCEDURE — 36415 COLL VENOUS BLD VENIPUNCTURE: CPT

## 2021-11-10 PROCEDURE — 83036 HEMOGLOBIN GLYCOSYLATED A1C: CPT

## 2021-11-10 PROCEDURE — 85025 COMPLETE CBC W/AUTO DIFF WBC: CPT

## 2021-11-10 PROCEDURE — 84443 ASSAY THYROID STIM HORMONE: CPT

## 2021-11-18 ENCOUNTER — OFFICE VISIT (OUTPATIENT)
Dept: INTERNAL MEDICINE CLINIC | Facility: CLINIC | Age: 81
End: 2021-11-18
Payer: MEDICARE

## 2021-11-18 VITALS
TEMPERATURE: 98 F | OXYGEN SATURATION: 95 % | HEART RATE: 64 BPM | HEIGHT: 71 IN | DIASTOLIC BLOOD PRESSURE: 60 MMHG | BODY MASS INDEX: 28 KG/M2 | WEIGHT: 200 LBS | SYSTOLIC BLOOD PRESSURE: 106 MMHG

## 2021-11-18 DIAGNOSIS — I25.10 ASHD (ARTERIOSCLEROTIC HEART DISEASE): ICD-10-CM

## 2021-11-18 DIAGNOSIS — M15.9 PRIMARY OSTEOARTHRITIS INVOLVING MULTIPLE JOINTS: ICD-10-CM

## 2021-11-18 DIAGNOSIS — N40.0 BENIGN NON-NODULAR PROSTATIC HYPERPLASIA WITHOUT LOWER URINARY TRACT SYMPTOMS: ICD-10-CM

## 2021-11-18 DIAGNOSIS — R97.20 ELEVATED PSA: ICD-10-CM

## 2021-11-18 DIAGNOSIS — Z86.010 HISTORY OF ADENOMATOUS POLYP OF COLON: ICD-10-CM

## 2021-11-18 DIAGNOSIS — G62.9 PERIPHERAL POLYNEUROPATHY: ICD-10-CM

## 2021-11-18 DIAGNOSIS — E78.00 HYPERCHOLESTEROLEMIA: ICD-10-CM

## 2021-11-18 DIAGNOSIS — Z00.00 ANNUAL PHYSICAL EXAM: Primary | ICD-10-CM

## 2021-11-18 DIAGNOSIS — M48.061 SPINAL STENOSIS OF LUMBAR REGION WITHOUT NEUROGENIC CLAUDICATION: ICD-10-CM

## 2021-11-18 DIAGNOSIS — R73.01 ABNORMAL FASTING GLUCOSE: ICD-10-CM

## 2021-11-18 DIAGNOSIS — I70.0 ATHEROSCLEROSIS OF AORTA (HCC): ICD-10-CM

## 2021-11-18 DIAGNOSIS — K57.30 DIVERTICULOSIS OF COLON: ICD-10-CM

## 2021-11-18 DIAGNOSIS — R53.83 FATIGUE, UNSPECIFIED TYPE: ICD-10-CM

## 2021-11-18 DIAGNOSIS — R74.8 ELEVATED CPK: ICD-10-CM

## 2021-11-18 PROCEDURE — 3008F BODY MASS INDEX DOCD: CPT | Performed by: INTERNAL MEDICINE

## 2021-11-18 PROCEDURE — 96160 PT-FOCUSED HLTH RISK ASSMT: CPT | Performed by: INTERNAL MEDICINE

## 2021-11-18 PROCEDURE — 3078F DIAST BP <80 MM HG: CPT | Performed by: INTERNAL MEDICINE

## 2021-11-18 PROCEDURE — 3074F SYST BP LT 130 MM HG: CPT | Performed by: INTERNAL MEDICINE

## 2021-11-18 PROCEDURE — G0439 PPPS, SUBSEQ VISIT: HCPCS | Performed by: INTERNAL MEDICINE

## 2021-11-18 PROCEDURE — 99397 PER PM REEVAL EST PAT 65+ YR: CPT | Performed by: INTERNAL MEDICINE

## 2021-11-18 RX ORDER — ACETAMINOPHEN AND CODEINE PHOSPHATE 300; 30 MG/1; MG/1
TABLET ORAL AS DIRECTED
COMMUNITY
End: 2021-11-18 | Stop reason: ALTCHOICE

## 2021-11-18 RX ORDER — AZITHROMYCIN 500 MG/1
500 TABLET, FILM COATED ORAL DAILY
COMMUNITY
Start: 2021-05-24 | End: 2021-11-18 | Stop reason: ALTCHOICE

## 2021-11-18 RX ORDER — MULTIVIT,TX WITH IRON,MINERALS
TABLET, EXTENDED RELEASE ORAL
COMMUNITY
Start: 2021-05-24

## 2021-11-18 RX ORDER — HYDROXYCHLOROQUINE SULFATE 200 MG/1
TABLET, FILM COATED ORAL
COMMUNITY
Start: 2021-05-24 | End: 2021-11-18 | Stop reason: ALTCHOICE

## 2021-11-18 RX ORDER — TRIPROLIDINE/PSEUDOEPHEDRINE 2.5MG-60MG
1 TABLET ORAL 4 TIMES DAILY
COMMUNITY
Start: 2021-08-16

## 2021-11-18 RX ORDER — ACYCLOVIR 400 MG/1
400 TABLET ORAL 4 TIMES DAILY
COMMUNITY
Start: 2021-08-16 | End: 2021-11-18 | Stop reason: ALTCHOICE

## 2021-11-18 RX ORDER — PREDNISOLONE ACETATE 10 MG/ML
1 SUSPENSION/ DROPS OPHTHALMIC 3 TIMES DAILY
COMMUNITY
Start: 2021-10-08

## 2021-11-18 NOTE — PATIENT INSTRUCTIONS
1.  Patient is to continue his current diet, medication and activity. 2.  Patient declines a flu vaccine. 3.  Patient does not wish to receive the COVID vaccines. 4.  I will see the patient back in 6 months with blood tests as ordered.   5.  Patient to f

## 2021-11-18 NOTE — PROGRESS NOTES
Mikey Gonzalez is a 68year old male who presents for a complete physical exam.   HPI:   Mr. Violetta Castellanos is a 77-year-old white male who was seen by me on November 18, 2021 for his Medicare advantage annual physical examination.   At the time of examwildo Alpha-Lipoic Acid 300 MG Oral Cap Take 1 tablet by mouth daily. • VITAMIN B COMPLEX-C OR Take 1 tablet by mouth daily.      • TURMERIC OR Turmeric Plus Leonor 1650/300mg daily      • Azelastine HCl 0.15 % Nasal Solution SPRAY ONE SPRAY IN EACH NOSTRIL T drinks      Types: 2 Glasses of wine per week    Drug use: No          REVIEW OF SYSTEMS:   GENERAL: feels well   EYES:denies blurred vision or double vision  HEENT: denies nasal congestion, sinus pain or ST  LUNGS: denies shortness of breath or cough  CAR cholesterol was 62 and LDL cholesterol was 84. Patient's AST is 27 and ALT is 32. Patient's TSH is 1.280. Patient's CPK is 431. Patient's PSA is 3.33. Patient's vitamin D level is 52.2. ASSESSMENT AND PLAN:   1.  Annual physical exam  Patient appear follows up with his urologist, Dr. Susu Cummings. 11. History of adenomatous polyp of colon  Stable. CPM.    12. Elevated CPK  Stable. CPM.  Patient's recent CPK is 431. I will continue to monitor this in the future.     - CK CREATINE KINASE (NOT CREATINI Fall/Risk Assessment                                                              Depression Screening (PHQ-2/PHQ-9): Over the LAST 2 WEEKS                      Advance Directives     Do you have a healthcare power of ?: Yes    Do you have a li covered at this frequency, by your insurer. Please check with your insurance carrier before scheduling to verify coverage.     PREVENTATIVE SERVICES  INDICATIONS AND SCHEDULE Internal Lab or Procedure External Lab or Procedure   Diabetes Screening      HbgA applicable     SPECIFIC DISEASE MONITORING Internal Lab or Procedure External Lab or Procedure   Annual Monitoring of Persistent     Medications (ACE/ARB, digoxin, diuretics)    Potassium  Annually Potassium (mmol/L)   Date Value   11/10/2021 4.5     MARITZA

## 2021-12-06 RX ORDER — LORAZEPAM 0.5 MG/1
0.5 TABLET ORAL NIGHTLY
Qty: 90 TABLET | Refills: 0 | OUTPATIENT
Start: 2021-12-06

## 2021-12-06 NOTE — TELEPHONE ENCOUNTER
#90 with 1 refill sent to [facility] on 10/7/21. Refills on file. Current refill request refused due to refill is either a duplicate request or has active refills at the pharmacy. Check previous templates.     Requested Prescriptions     Refused Prescripti

## 2021-12-11 ENCOUNTER — PATIENT MESSAGE (OUTPATIENT)
Dept: INTERNAL MEDICINE CLINIC | Facility: CLINIC | Age: 81
End: 2021-12-11

## 2021-12-11 RX ORDER — LORAZEPAM 0.5 MG/1
0.5 TABLET ORAL NIGHTLY
Qty: 90 TABLET | Refills: 0 | OUTPATIENT
Start: 2021-12-11

## 2021-12-13 NOTE — TELEPHONE ENCOUNTER
From: Susanne Gonzalez  To: Masoud Morrison MD  Sent: 12/11/2021 1:57 PM CST  Subject: Lorazepam 0.5mg (new) renewal -90day renewals    Hi Dr. Kai Velez. [facility] [address] [facility] [facility] to fill at this pharmacy[facility]   Thank you in advance

## 2021-12-13 NOTE — TELEPHONE ENCOUNTER
Per [place] , patient has only been picking up #30 supply. Called [facility] to see if this canceled out the #90. Spoke with Dia from [facility] who reports patient's last refill was on 12/11/21. His insurance only covers #30.  He still has the 1 refill of 90 t

## 2021-12-14 ENCOUNTER — TELEPHONE (OUTPATIENT)
Dept: INTERNAL MEDICINE CLINIC | Facility: CLINIC | Age: 81
End: 2021-12-14

## 2021-12-14 NOTE — TELEPHONE ENCOUNTER
----- Message from Leopold Otter sent at 12/13/2021  8:05 PM CST -----  Regarding: Lorazepam 0.5mg  (new) renewal -90day renewals  David Vargas, the reasons I requested a new script was because [place] had told me my renewal request was denied and I needed/ha

## 2021-12-30 ENCOUNTER — MED REC SCAN ONLY (OUTPATIENT)
Dept: NEUROLOGY | Facility: CLINIC | Age: 81
End: 2021-12-30

## 2022-02-07 RX ORDER — LORAZEPAM 0.5 MG/1
0.5 TABLET ORAL NIGHTLY
Qty: 90 TABLET | Refills: 2 | OUTPATIENT
Start: 2022-02-07

## 2022-04-02 ENCOUNTER — TELEPHONE (OUTPATIENT)
Dept: INTERNAL MEDICINE CLINIC | Facility: CLINIC | Age: 82
End: 2022-04-02

## 2022-04-04 NOTE — TELEPHONE ENCOUNTER
To Dr. Tejada Comes----    The above refill request is for a controlled substance. Please review pended medication order.       Lov: 11/18/21  Prescribed:  #90 with 1 10/7/21  : #30 2/7/22

## 2022-04-06 RX ORDER — LORAZEPAM 0.5 MG/1
TABLET ORAL
Qty: 90 TABLET | Refills: 1 | Status: SHIPPED | OUTPATIENT
Start: 2022-04-06

## 2022-04-20 ENCOUNTER — TELEPHONE (OUTPATIENT)
Dept: INTERNAL MEDICINE CLINIC | Facility: CLINIC | Age: 82
End: 2022-04-20

## 2022-04-20 ENCOUNTER — PATIENT MESSAGE (OUTPATIENT)
Dept: INTERNAL MEDICINE CLINIC | Facility: CLINIC | Age: 82
End: 2022-04-20

## 2022-04-20 RX ORDER — AZELASTINE HCL 205.5 UG/1
SPRAY NASAL
Qty: 30 ML | Refills: 3 | Status: SHIPPED | OUTPATIENT
Start: 2022-04-20

## 2022-04-20 NOTE — TELEPHONE ENCOUNTER
----- Message from Nicolina Cheadle sent at 4/20/2022  4:27 PM CDT -----  Regarding: Azelastine HCI nasal solution0.15%  Dr Patti Tomlinson: I am having a more difficult Spring allergy season. Even 2 tablets of Loratadine 10mg, i.e. Claritin is not sufficient to stop constant nasal dripping and sneezings. In the past peak allergy seasons, I have been using Azelastine HCI 0.15%, but I am now nearly out of this product. I would appreciate you authorizing and sending the script to [address] [place] [address] [facility]. [facility]  Thank you in advance. Rehan Craig  Please have someone notify me when sent so I may follow up with Johan Henriquez.

## 2022-04-20 NOTE — TELEPHONE ENCOUNTER
From: [place]  To: Renetta Ryan MD  Sent: 4/20/2022 4:27 PM CDT  Subject: Azelastine HCI nasal solution0.15%    Dr Emily Yoo: I am having a more difficult Spring allergy season. Even 2 tablets of Loratadine 10mg, i.e. Claritin is not sufficient to stop constant nasal dripping and sneezings. In the past peak allergy seasons, I have been using Azelastine HCI 0.15%, but I am now nearly out of this product. I would appreciate you authorizing and sending the script to [address] [place] [address] on [facility]. (not [facility])  Thank you in advance. Stacy Gilliam  Please have someone notify me when sent so I may follow up with Melba Muniz.

## 2022-04-20 NOTE — TELEPHONE ENCOUNTER
Noted.  I have sent a prescription for patient's medication azelastine HCl 0.15% through the patient's pharmacy. Please notify patient that this is been done at patient request..  I will route this to nursing.   Thank you!!

## 2022-04-27 ENCOUNTER — TELEPHONE (OUTPATIENT)
Dept: INTERNAL MEDICINE CLINIC | Facility: CLINIC | Age: 82
End: 2022-04-27

## 2022-04-27 NOTE — TELEPHONE ENCOUNTER
To Milton Covington--Pt's wife just diagnosed with covid and he is asking if he should be tested. Thanks.

## 2022-04-27 NOTE — TELEPHONE ENCOUNTER
Phone call to patient. Patient's wife is come down with COVID. Patient had COVID last year. Patient feels well at this time and has no symptoms. I have advised him to obtain a COVID test.  I have placed an order in the system for him to have a COVID test.  Patient will arrange to have this done in the near future. Patient currently feels well. I have advised the patient to self quarantine for about 10 days. Patient is currently unvaccinated though he did have COVID last year.

## 2022-04-28 ENCOUNTER — LAB ENCOUNTER (OUTPATIENT)
Dept: LAB | Age: 82
End: 2022-04-28
Attending: INTERNAL MEDICINE
Payer: MEDICARE

## 2022-04-28 DIAGNOSIS — Z20.822 CLOSE EXPOSURE TO COVID-19 VIRUS: ICD-10-CM

## 2022-04-29 ENCOUNTER — TELEPHONE (OUTPATIENT)
Dept: INTERNAL MEDICINE CLINIC | Facility: CLINIC | Age: 82
End: 2022-04-29

## 2022-04-29 LAB — SARS-COV-2 RNA RESP QL NAA+PROBE: DETECTED

## 2022-04-29 NOTE — TELEPHONE ENCOUNTER
Patient has tested positive for COVID. I called the patient to let him know the result. At this point patient is unvaccinated. I left a message for the patient that I will send a prescription for Paxlovid to his pharmacy. Unfortunately [facility] does not carry this medication. I told the patient that I will send the prescription to the [facility] for him. I also told him that while he is taking the medication he should not take his tamsulosin as it may be a problem with low blood pressure if he mixes that with tamsulosin with the Paxlovid. Patient may choose not to take the Paxlovid. Patient is advised that if he does get shortness of breath or feels worse he should go to the emergency room. I will forward this message to nursing to check on the patient on Monday.   Thank you!!

## 2022-05-02 ENCOUNTER — TELEPHONE (OUTPATIENT)
Dept: SURGERY | Facility: CLINIC | Age: 82
End: 2022-05-02

## 2022-05-02 NOTE — TELEPHONE ENCOUNTER
Spoke with patient regarding message below. He did not take the paxlovid as he read the instructions and it said to use within 5 days of symptom onset. He tells me the day after Easter he had some chills that resolved within 24 hours. He also had an \"allergy attack\" around 4/20, which he does get around this time of year, and his symptoms resolved within a few days after using the azelastine nasal spray. He currently has no symptoms so he did not feel paxlovid is needed. He wonders if he was positive earlier and this is a positive result from a past infection. Advised that, since we cannot be sure it is from a past infection, he needs to self isolate x5 days and remain masked around others for an additional 5 days after isolating, he agrees to do so. TO Dr. Alphonso Hart-- patient will be traveling to [facility] May 10 and returning on May 22. He needs a letter either written or sent via my chart stating that he is clear for travel since he may still test positive even though his quarantine is over. Please advise, thanks!

## 2022-05-02 NOTE — TELEPHONE ENCOUNTER
I called pt verified name/ rescheduled pt mohinder from 22 to 22 reminded pt to have testing done before the visit, pt aware and understands     5. Visit in 1 year.   3--10 days before visit, please get the following tests--PSA prostate cancer screening; testosterone--free and total; urinalysis urine test

## 2022-05-05 NOTE — TELEPHONE ENCOUNTER
Pt came in and states he really needs that letter stating that he has recovered from Covid and he is cleared to travel. Pt is going out of the country and needs it tomorrow 5/6/22. Pt will pick at  or will print from my chart.     Please call and advise    See Alicia's note below

## 2022-05-05 NOTE — TELEPHONE ENCOUNTER
Routed to Dr. Eladia Dee-- please advise on letter that patient is requesting for travel. Thank you!

## 2022-05-06 NOTE — TELEPHONE ENCOUNTER
Noted.  I have discussed the situation with Mr. Kirk Keating. Patient currently feels well and has no symptoms. Patient appears to have recovered from his COVID infection with virtually no symptoms. Patient is quarantine for 10 days. He is no longer contagious. He can go on his trip as scheduled. I have written a letter for him that he can  from our office and take with him and use as necessary on his trip. I have left the letter in an envelope with the patient's name on my nurse's desk. I have contacted the patient to let them know that that that letter will be available tomorrow/Friday morning. Patient will come Friday to  the letter. I will forward this message to the  so they will be aware of this.   Thank you!!

## 2022-05-25 ENCOUNTER — TELEPHONE (OUTPATIENT)
Dept: SURGERY | Facility: CLINIC | Age: 82
End: 2022-05-25

## 2022-05-25 ENCOUNTER — TELEPHONE (OUTPATIENT)
Dept: INTERNAL MEDICINE CLINIC | Facility: CLINIC | Age: 82
End: 2022-05-25

## 2022-05-25 DIAGNOSIS — N40.1 BENIGN PROSTATIC HYPERPLASIA WITH URINARY FREQUENCY: Primary | ICD-10-CM

## 2022-05-25 DIAGNOSIS — E55.9 VITAMIN D DEFICIENCY: Primary | ICD-10-CM

## 2022-05-25 DIAGNOSIS — R35.0 BENIGN PROSTATIC HYPERPLASIA WITH URINARY FREQUENCY: Primary | ICD-10-CM

## 2022-05-25 NOTE — TELEPHONE ENCOUNTER
Patient is requesting Vitamin D be added to his lab order. Patient will have his labs drawn on Thursday 5/26.     Tasked to nursing

## 2022-05-25 NOTE — TELEPHONE ENCOUNTER
Lab ordered were . Re-ordered PSA screen, UA, and testosterone free and total today. Called pt and notified of this.

## 2022-05-25 NOTE — TELEPHONE ENCOUNTER
Patient states he is at the lab for a urine test and blood work before his appointment on 05/31. No order on file.  Please advise

## 2022-05-25 NOTE — TELEPHONE ENCOUNTER
To Dr. DINERO to please advise if OK to add vitamin D level to lab order for patient's blood work tomorrow? Last vitamin D level 52.2 on 11/10/21. Thanks!

## 2022-05-25 NOTE — TELEPHONE ENCOUNTER
Noted. Please notify patient that I have added a vitamin D level to his lab test for tomorrow. I will forward the message to nursing.   Thank you!!

## 2022-05-26 ENCOUNTER — LAB ENCOUNTER (OUTPATIENT)
Dept: LAB | Age: 82
End: 2022-05-26
Attending: UROLOGY
Payer: MEDICARE

## 2022-05-26 DIAGNOSIS — R73.01 ABNORMAL FASTING GLUCOSE: ICD-10-CM

## 2022-05-26 DIAGNOSIS — R74.8 ELEVATED CPK: ICD-10-CM

## 2022-05-26 DIAGNOSIS — R53.83 FATIGUE, UNSPECIFIED TYPE: ICD-10-CM

## 2022-05-26 DIAGNOSIS — N40.1 BENIGN PROSTATIC HYPERPLASIA WITH URINARY FREQUENCY: ICD-10-CM

## 2022-05-26 DIAGNOSIS — E78.00 HYPERCHOLESTEROLEMIA: ICD-10-CM

## 2022-05-26 DIAGNOSIS — E55.9 VITAMIN D DEFICIENCY: ICD-10-CM

## 2022-05-26 DIAGNOSIS — R35.0 BENIGN PROSTATIC HYPERPLASIA WITH URINARY FREQUENCY: ICD-10-CM

## 2022-05-26 LAB
ALT SERPL-CCNC: 31 U/L
ANION GAP SERPL CALC-SCNC: 5 MMOL/L (ref 0–18)
AST SERPL-CCNC: 32 U/L (ref 15–37)
BILIRUB UR QL: NEGATIVE
BUN BLD-MCNC: 15 MG/DL (ref 7–18)
BUN/CREAT SERPL: 13.6 (ref 10–20)
CALCIUM BLD-MCNC: 9.5 MG/DL (ref 8.5–10.1)
CHLORIDE SERPL-SCNC: 107 MMOL/L (ref 98–112)
CHOLEST SERPL-MCNC: 183 MG/DL (ref ?–200)
CK SERPL-CCNC: 573 U/L
CO2 SERPL-SCNC: 27 MMOL/L (ref 21–32)
COLOR UR: YELLOW
COMPLEXED PSA SERPL-MCNC: 2.52 NG/ML (ref ?–4)
CREAT BLD-MCNC: 1.1 MG/DL
EST. AVERAGE GLUCOSE BLD GHB EST-MCNC: 126 MG/DL (ref 68–126)
FASTING PATIENT LIPID ANSWER: YES
FASTING STATUS PATIENT QL REPORTED: YES
GLUCOSE BLD-MCNC: 112 MG/DL (ref 70–99)
GLUCOSE UR-MCNC: NEGATIVE MG/DL
HBA1C MFR BLD: 6 % (ref ?–5.7)
HDLC SERPL-MCNC: 57 MG/DL (ref 40–59)
HGB UR QL STRIP.AUTO: NEGATIVE
KETONES UR-MCNC: NEGATIVE MG/DL
LDLC SERPL CALC-MCNC: 108 MG/DL (ref ?–100)
LEUKOCYTE ESTERASE UR QL STRIP.AUTO: NEGATIVE
NITRITE UR QL STRIP.AUTO: NEGATIVE
NONHDLC SERPL-MCNC: 126 MG/DL (ref ?–130)
OSMOLALITY SERPL CALC.SUM OF ELEC: 290 MOSM/KG (ref 275–295)
PH UR: 6 [PH] (ref 5–8)
POTASSIUM SERPL-SCNC: 4 MMOL/L (ref 3.5–5.1)
PROT UR-MCNC: NEGATIVE MG/DL
SODIUM SERPL-SCNC: 139 MMOL/L (ref 136–145)
SP GR UR STRIP: 1.02 (ref 1–1.03)
TRIGL SERPL-MCNC: 97 MG/DL (ref 30–149)
UROBILINOGEN UR STRIP-ACNC: <2
VIT C UR-MCNC: 40 MG/DL
VIT D+METAB SERPL-MCNC: 50.5 NG/ML (ref 30–100)
VLDLC SERPL CALC-MCNC: 16 MG/DL (ref 0–30)

## 2022-05-26 PROCEDURE — 84402 ASSAY OF FREE TESTOSTERONE: CPT

## 2022-05-26 PROCEDURE — 36415 COLL VENOUS BLD VENIPUNCTURE: CPT

## 2022-05-26 PROCEDURE — 81001 URINALYSIS AUTO W/SCOPE: CPT

## 2022-05-26 PROCEDURE — 80048 BASIC METABOLIC PNL TOTAL CA: CPT

## 2022-05-26 PROCEDURE — 83036 HEMOGLOBIN GLYCOSYLATED A1C: CPT

## 2022-05-26 PROCEDURE — 84403 ASSAY OF TOTAL TESTOSTERONE: CPT

## 2022-05-26 PROCEDURE — 82550 ASSAY OF CK (CPK): CPT

## 2022-05-26 PROCEDURE — 82306 VITAMIN D 25 HYDROXY: CPT

## 2022-05-26 PROCEDURE — 84460 ALANINE AMINO (ALT) (SGPT): CPT

## 2022-05-26 PROCEDURE — 80061 LIPID PANEL: CPT

## 2022-05-26 PROCEDURE — 84450 TRANSFERASE (AST) (SGOT): CPT

## 2022-05-31 ENCOUNTER — OFFICE VISIT (OUTPATIENT)
Dept: SURGERY | Facility: CLINIC | Age: 82
End: 2022-05-31
Payer: MEDICARE

## 2022-05-31 DIAGNOSIS — N52.01 ERECTILE DYSFUNCTION DUE TO ARTERIAL INSUFFICIENCY: ICD-10-CM

## 2022-05-31 DIAGNOSIS — R97.20 ELEVATED PSA: ICD-10-CM

## 2022-05-31 DIAGNOSIS — N48.6 PEYRONIE'S DISEASE: ICD-10-CM

## 2022-05-31 DIAGNOSIS — Z87.898 HISTORY OF DYSURIA: ICD-10-CM

## 2022-05-31 DIAGNOSIS — N20.0 KIDNEY STONE: Primary | ICD-10-CM

## 2022-05-31 DIAGNOSIS — Z87.448 HISTORY OF HEMATURIA: ICD-10-CM

## 2022-05-31 DIAGNOSIS — Z79.82 LONG TERM CURRENT USE OF ASPIRIN: ICD-10-CM

## 2022-05-31 DIAGNOSIS — R35.0 BENIGN PROSTATIC HYPERPLASIA WITH URINARY FREQUENCY: ICD-10-CM

## 2022-05-31 DIAGNOSIS — Z12.5 PROSTATE CANCER SCREENING: ICD-10-CM

## 2022-05-31 DIAGNOSIS — N40.1 BENIGN PROSTATIC HYPERPLASIA WITH URINARY FREQUENCY: ICD-10-CM

## 2022-05-31 DIAGNOSIS — N28.1 RENAL CYST: ICD-10-CM

## 2022-05-31 DIAGNOSIS — R35.1 NOCTURIA: ICD-10-CM

## 2022-05-31 PROCEDURE — 99214 OFFICE O/P EST MOD 30 MIN: CPT | Performed by: UROLOGY

## 2022-05-31 NOTE — PATIENT INSTRUCTIONS
Millicent Crowell M.D.    1.   If you are interested in Sylvie Rodrigez of BPH, please consider seeing Dr. Amie Dale of [facility]. 2.  Continue finasteride 5 mg daily    3. To lower your chances of developing kidney stone or having  existing stones increase in size, please avoid salty foods and also sodium and salt in your diet --avoid fast foods, frozen foods, pizza which are very high in salt; restaurant food is naturally high in salt so if you do eat at restaurants, ask the   to prepare a low-salt meal.  Limit foods high in oxalate such as nuts, peanut butter, tea, ice tea, green tea, cocoa, spinach, dark green leafy vegetables, dark berries. Also the official recommendation is to drink 2.5 liters of water and lemonade ( 8 cups! )  per day. Lemonade is high in the natural chemical citrate  which in general lowers probability of forming kidney stones. Your dentist may be upset with you drinking lemonade because it might wear down the enamel on your teeth, so the solution would be to drink lemonade through a straw. Try to cut back on oral liquids for 3 hours before bedtime,  so that you are not bothered by waking up at night to urinate. Severely restricting calcium in the diet may actually make things worse and we do want you to take in some calcium in your diet, up to 1,200 mg a day; rule of thumb--you may have 2 servings or possibly up to 3 servings of dairy daily, however, try to avoid cheese which is naturally high in salt. Be careful not to take high doses of calcium; you may be better off taking vitamin D3 instead. With respect to how much vitamin D3 to take, getting blood levels of vitamin D once or twice a year may be the best way to monitor this, but check with your primary physician. 4.   Your PSA presently, corrected for finasteride, is mildly elevated.   Today you decided that for now to observe that elevation which was worse 2 years ago and 3 years ago.    5.  Return visit in 1 year. Please get blood draw for PSA on or after 5/27/2023. At that time also submit urine specimen for complete urinalysis.   Please make an appointment to see either Dr. Maximus Mead or Dr. Lona Maharaj in our practice

## 2022-06-01 LAB
TESTOSTERONE, FREE, S: 8.5 NG/DL
TESTOSTERONE, TOTAL, S: 425 NG/DL

## 2022-06-08 DIAGNOSIS — N40.1 BENIGN PROSTATIC HYPERPLASIA WITH URINARY FREQUENCY: Primary | ICD-10-CM

## 2022-06-08 DIAGNOSIS — R35.0 BENIGN PROSTATIC HYPERPLASIA WITH URINARY FREQUENCY: Primary | ICD-10-CM

## 2022-06-09 RX ORDER — LORAZEPAM 0.5 MG/1
TABLET ORAL
Qty: 90 TABLET | Refills: 0 | OUTPATIENT
Start: 2022-06-09

## 2022-06-09 RX ORDER — FINASTERIDE 5 MG/1
TABLET, FILM COATED ORAL
Qty: 90 TABLET | Refills: 2 | Status: SHIPPED | OUTPATIENT
Start: 2022-06-09

## 2022-06-09 RX ORDER — TAMSULOSIN HYDROCHLORIDE 0.4 MG/1
CAPSULE ORAL
Qty: 90 CAPSULE | Refills: 1 | Status: SHIPPED | OUTPATIENT
Start: 2022-06-09

## 2022-06-10 RX ORDER — LORAZEPAM 0.5 MG/1
TABLET ORAL
Qty: 90 TABLET | Refills: 0 | OUTPATIENT
Start: 2022-06-10

## 2022-07-11 ENCOUNTER — TELEPHONE (OUTPATIENT)
Dept: INTERNAL MEDICINE CLINIC | Facility: CLINIC | Age: 82
End: 2022-07-11

## 2022-07-11 RX ORDER — LORAZEPAM 0.5 MG/1
0.5 TABLET ORAL NIGHTLY
Qty: 90 TABLET | Refills: 1 | Status: SHIPPED | OUTPATIENT
Start: 2022-07-11

## 2022-07-11 NOTE — TELEPHONE ENCOUNTER
To MD:  The above refill request is for a controlled substance. Please review pended medication order. Print and sign for staff to fax to pharmacy or prescribe electronically. Last office visit: 11/18/21  Last time refill sent and quantity/refills: 4/6/22 #90 with 1 to [facility]  Per [place]  #30 last dispensed on 5/7/22. TO Dr. Tejas Moore to please advise-- [facility] recently closed down and I don't believe their controls were transferred to an alternate pharmacy.

## 2022-07-12 NOTE — TELEPHONE ENCOUNTER
Noted.  I have refilled patient's prescription as requested.   I have [facility] the [facility].

## 2022-07-13 DIAGNOSIS — N40.1 BENIGN PROSTATIC HYPERPLASIA WITH URINARY FREQUENCY: ICD-10-CM

## 2022-07-13 DIAGNOSIS — R35.0 BENIGN PROSTATIC HYPERPLASIA WITH URINARY FREQUENCY: ICD-10-CM

## 2022-07-13 NOTE — TELEPHONE ENCOUNTER
Received fax from [facility], [address] [facility] pharmacy (formerly [address]) requesting refill for Finasteride 5mg, and Tamsulosin 0.4mg. Per patients chart last refill for finasteride was sent on 6/9/22 to [facility] [address] for 9 month supply, and tamsulosin for 6 months. Spoke with patient and he was advised that [facility] [address] is going out of business and would like to change prescription to Mail order pharmacy. Orders pended. Please advise.

## 2022-07-18 ENCOUNTER — TELEPHONE (OUTPATIENT)
Dept: INTERNAL MEDICINE CLINIC | Facility: CLINIC | Age: 82
End: 2022-07-18

## 2022-07-19 NOTE — TELEPHONE ENCOUNTER
From: Santiago Sher  Sent: 7/19/2022 3:26 PM CDT  To: [facility] Clinical Staff  Subject: Appointment       that's ok, we all have to make some quick changes in our schedules. I will call for another appointment.

## 2022-07-25 RX ORDER — TAMSULOSIN HYDROCHLORIDE 0.4 MG/1
0.4 CAPSULE ORAL
Qty: 90 CAPSULE | Refills: 3 | Status: SHIPPED | OUTPATIENT
Start: 2022-07-25

## 2022-07-25 RX ORDER — FINASTERIDE 5 MG/1
5 TABLET, FILM COATED ORAL DAILY
Qty: 90 TABLET | Refills: 3 | Status: SHIPPED | OUTPATIENT
Start: 2022-07-25

## 2022-08-08 ENCOUNTER — OFFICE VISIT (OUTPATIENT)
Dept: INTERNAL MEDICINE CLINIC | Facility: CLINIC | Age: 82
End: 2022-08-08
Payer: MEDICARE

## 2022-08-08 VITALS
TEMPERATURE: 98 F | WEIGHT: 195.19 LBS | OXYGEN SATURATION: 97 % | HEIGHT: 70.5 IN | BODY MASS INDEX: 27.63 KG/M2 | DIASTOLIC BLOOD PRESSURE: 60 MMHG | SYSTOLIC BLOOD PRESSURE: 120 MMHG | HEART RATE: 64 BPM

## 2022-08-08 DIAGNOSIS — M48.061 SPINAL STENOSIS OF LUMBAR REGION WITHOUT NEUROGENIC CLAUDICATION: ICD-10-CM

## 2022-08-08 DIAGNOSIS — Z86.010 HISTORY OF ADENOMATOUS POLYP OF COLON: ICD-10-CM

## 2022-08-08 DIAGNOSIS — N40.0 BENIGN NON-NODULAR PROSTATIC HYPERPLASIA WITHOUT LOWER URINARY TRACT SYMPTOMS: ICD-10-CM

## 2022-08-08 DIAGNOSIS — K57.30 DIVERTICULOSIS OF COLON: ICD-10-CM

## 2022-08-08 DIAGNOSIS — R97.20 ELEVATED PSA: ICD-10-CM

## 2022-08-08 DIAGNOSIS — I70.0 ATHEROSCLEROSIS OF AORTA (HCC): ICD-10-CM

## 2022-08-08 DIAGNOSIS — I25.10 ASHD (ARTERIOSCLEROTIC HEART DISEASE): Primary | ICD-10-CM

## 2022-08-08 DIAGNOSIS — R74.8 ELEVATED CPK: ICD-10-CM

## 2022-08-08 DIAGNOSIS — Z00.00 ANNUAL PHYSICAL EXAM: ICD-10-CM

## 2022-08-08 DIAGNOSIS — R53.83 FATIGUE, UNSPECIFIED TYPE: ICD-10-CM

## 2022-08-08 DIAGNOSIS — M15.9 PRIMARY OSTEOARTHRITIS INVOLVING MULTIPLE JOINTS: ICD-10-CM

## 2022-08-08 DIAGNOSIS — E78.00 HYPERCHOLESTEROLEMIA: ICD-10-CM

## 2022-08-08 DIAGNOSIS — G62.9 PERIPHERAL POLYNEUROPATHY: ICD-10-CM

## 2022-08-08 DIAGNOSIS — R73.01 ABNORMAL FASTING GLUCOSE: ICD-10-CM

## 2022-08-08 NOTE — PATIENT INSTRUCTIONS
1.  Patient is to continue his current diet, medication and activity. 2.  Patient does not wish to receive the COVID-vaccine. 3.  I will plan to see the patient back in about 3 months with blood test, urinalysis and EKG for his annual physical examination. 4.  I will see the patient back sooner as necessary. 5.  I have advised the patient to follow-up with Dr. Marisol Thompson regarding his episodes of shortness of breath when he exerts himself more than usual.  6.  I have also advised the patient to see either Dr. Ye Martinez or Dr Chip Dorantes as a urologist to see now that Dr. Jeff Jovel is retiring. 7.  If patient wishes to stop his Ativan at bedtime I have recommended that he take it every other day for 2 to 3 weeks, then every third day for 2 to 3 weeks, then stop.

## 2022-09-15 ENCOUNTER — TELEPHONE (OUTPATIENT)
Dept: NEUROLOGY | Facility: CLINIC | Age: 82
End: 2022-09-15

## 2022-09-15 NOTE — TELEPHONE ENCOUNTER
Called patient to schedule next available appointment. No answer left voicemail to call office back to schedule.

## 2022-09-16 NOTE — TELEPHONE ENCOUNTER
Called patient & offered earlier appointment with Dr Suzanne Sanchez on 10/19. Patient initially accepted & then declined for personal reasons. He decided to keep the appt with Dr Shanell Ji. States he has no pain at this time.

## 2022-11-10 ENCOUNTER — LAB ENCOUNTER (OUTPATIENT)
Dept: LAB | Age: 82
End: 2022-11-10
Attending: INTERNAL MEDICINE
Payer: MEDICARE

## 2022-11-10 DIAGNOSIS — Z00.00 ANNUAL PHYSICAL EXAM: ICD-10-CM

## 2022-11-10 DIAGNOSIS — R73.01 ABNORMAL FASTING GLUCOSE: ICD-10-CM

## 2022-11-10 DIAGNOSIS — R53.83 FATIGUE, UNSPECIFIED TYPE: ICD-10-CM

## 2022-11-10 DIAGNOSIS — E78.00 HYPERCHOLESTEROLEMIA: ICD-10-CM

## 2022-11-10 DIAGNOSIS — N40.0 BENIGN NON-NODULAR PROSTATIC HYPERPLASIA WITHOUT LOWER URINARY TRACT SYMPTOMS: ICD-10-CM

## 2022-11-10 DIAGNOSIS — R97.20 ELEVATED PSA: ICD-10-CM

## 2022-11-10 LAB
ALBUMIN SERPL-MCNC: 3.7 G/DL (ref 3.4–5)
ALBUMIN/GLOB SERPL: 1 {RATIO} (ref 1–2)
ALP LIVER SERPL-CCNC: 66 U/L
ALT SERPL-CCNC: 34 U/L
ANION GAP SERPL CALC-SCNC: 5 MMOL/L (ref 0–18)
AST SERPL-CCNC: 28 U/L (ref 15–37)
BASOPHILS # BLD AUTO: 0.05 X10(3) UL (ref 0–0.2)
BASOPHILS NFR BLD AUTO: 0.8 %
BILIRUB SERPL-MCNC: 0.6 MG/DL (ref 0.1–2)
BILIRUB UR QL: NEGATIVE
BUN BLD-MCNC: 12 MG/DL (ref 7–18)
BUN/CREAT SERPL: 11 (ref 10–20)
CALCIUM BLD-MCNC: 9.6 MG/DL (ref 8.5–10.1)
CHLORIDE SERPL-SCNC: 107 MMOL/L (ref 98–112)
CHOLEST SERPL-MCNC: 156 MG/DL (ref ?–200)
CLARITY UR: CLEAR
CO2 SERPL-SCNC: 28 MMOL/L (ref 21–32)
COLOR UR: YELLOW
CREAT BLD-MCNC: 1.09 MG/DL
DEPRECATED RDW RBC AUTO: 46.5 FL (ref 35.1–46.3)
EOSINOPHIL # BLD AUTO: 0.53 X10(3) UL (ref 0–0.7)
EOSINOPHIL NFR BLD AUTO: 8.2 %
ERYTHROCYTE [DISTWIDTH] IN BLOOD BY AUTOMATED COUNT: 12.6 % (ref 11–15)
EST. AVERAGE GLUCOSE BLD GHB EST-MCNC: 128 MG/DL (ref 68–126)
FASTING PATIENT LIPID ANSWER: YES
FASTING STATUS PATIENT QL REPORTED: YES
GFR SERPLBLD BASED ON 1.73 SQ M-ARVRAT: 69 ML/MIN/1.73M2 (ref 60–?)
GLOBULIN PLAS-MCNC: 3.6 G/DL (ref 2.8–4.4)
GLUCOSE BLD-MCNC: 100 MG/DL (ref 70–99)
GLUCOSE UR-MCNC: NEGATIVE MG/DL
HBA1C MFR BLD: 6.1 % (ref ?–5.7)
HCT VFR BLD AUTO: 41.6 %
HDLC SERPL-MCNC: 60 MG/DL (ref 40–59)
HGB BLD-MCNC: 13.8 G/DL
HGB UR QL STRIP.AUTO: NEGATIVE
IMM GRANULOCYTES # BLD AUTO: 0.02 X10(3) UL (ref 0–1)
IMM GRANULOCYTES NFR BLD: 0.3 %
KETONES UR-MCNC: NEGATIVE MG/DL
LDLC SERPL CALC-MCNC: 81 MG/DL (ref ?–100)
LEUKOCYTE ESTERASE UR QL STRIP.AUTO: NEGATIVE
LYMPHOCYTES # BLD AUTO: 1.46 X10(3) UL (ref 1–4)
LYMPHOCYTES NFR BLD AUTO: 22.7 %
MCH RBC QN AUTO: 33.1 PG (ref 26–34)
MCHC RBC AUTO-ENTMCNC: 33.2 G/DL (ref 31–37)
MCV RBC AUTO: 99.8 FL
MONOCYTES # BLD AUTO: 0.64 X10(3) UL (ref 0.1–1)
MONOCYTES NFR BLD AUTO: 10 %
NEUTROPHILS # BLD AUTO: 3.73 X10 (3) UL (ref 1.5–7.7)
NEUTROPHILS # BLD AUTO: 3.73 X10(3) UL (ref 1.5–7.7)
NEUTROPHILS NFR BLD AUTO: 58 %
NITRITE UR QL STRIP.AUTO: NEGATIVE
NONHDLC SERPL-MCNC: 96 MG/DL (ref ?–130)
OSMOLALITY SERPL CALC.SUM OF ELEC: 290 MOSM/KG (ref 275–295)
PH UR: 6 [PH] (ref 5–8)
PLATELET # BLD AUTO: 177 10(3)UL (ref 150–450)
POTASSIUM SERPL-SCNC: 4.6 MMOL/L (ref 3.5–5.1)
PROT SERPL-MCNC: 7.3 G/DL (ref 6.4–8.2)
PROT UR-MCNC: NEGATIVE MG/DL
PSA SERPL-MCNC: 4.45 NG/ML (ref ?–4)
RBC # BLD AUTO: 4.17 X10(6)UL
SODIUM SERPL-SCNC: 140 MMOL/L (ref 136–145)
SP GR UR STRIP: 1.02 (ref 1–1.03)
TRIGL SERPL-MCNC: 77 MG/DL (ref 30–149)
TSI SER-ACNC: 1.31 MIU/ML (ref 0.36–3.74)
UROBILINOGEN UR STRIP-ACNC: <2
VIT C UR-MCNC: 40 MG/DL
VLDLC SERPL CALC-MCNC: 12 MG/DL (ref 0–30)
WBC # BLD AUTO: 6.4 X10(3) UL (ref 4–11)

## 2022-11-10 PROCEDURE — 83036 HEMOGLOBIN GLYCOSYLATED A1C: CPT

## 2022-11-10 PROCEDURE — 84443 ASSAY THYROID STIM HORMONE: CPT

## 2022-11-10 PROCEDURE — 85025 COMPLETE CBC W/AUTO DIFF WBC: CPT

## 2022-11-10 PROCEDURE — 80061 LIPID PANEL: CPT

## 2022-11-10 PROCEDURE — 82306 VITAMIN D 25 HYDROXY: CPT | Performed by: INTERNAL MEDICINE

## 2022-11-10 PROCEDURE — 82550 ASSAY OF CK (CPK): CPT | Performed by: INTERNAL MEDICINE

## 2022-11-10 PROCEDURE — 80053 COMPREHEN METABOLIC PANEL: CPT

## 2022-11-10 PROCEDURE — 81001 URINALYSIS AUTO W/SCOPE: CPT | Performed by: INTERNAL MEDICINE

## 2022-11-10 PROCEDURE — 82607 VITAMIN B-12: CPT | Performed by: INTERNAL MEDICINE

## 2022-11-10 PROCEDURE — 84153 ASSAY OF PSA TOTAL: CPT

## 2022-11-10 PROCEDURE — 36415 COLL VENOUS BLD VENIPUNCTURE: CPT

## 2022-11-21 ENCOUNTER — OFFICE VISIT (OUTPATIENT)
Dept: INTERNAL MEDICINE CLINIC | Facility: CLINIC | Age: 82
End: 2022-11-21
Payer: MEDICARE

## 2022-11-21 VITALS
BODY MASS INDEX: 27.6 KG/M2 | HEART RATE: 69 BPM | HEIGHT: 71 IN | SYSTOLIC BLOOD PRESSURE: 104 MMHG | OXYGEN SATURATION: 96 % | WEIGHT: 197.19 LBS | DIASTOLIC BLOOD PRESSURE: 68 MMHG

## 2022-11-21 DIAGNOSIS — Z13.29 SCREENING FOR THYROID DISORDER: ICD-10-CM

## 2022-11-21 DIAGNOSIS — N40.0 BENIGN NON-NODULAR PROSTATIC HYPERPLASIA WITHOUT LOWER URINARY TRACT SYMPTOMS: ICD-10-CM

## 2022-11-21 DIAGNOSIS — E55.9 VITAMIN D DEFICIENCY: ICD-10-CM

## 2022-11-21 DIAGNOSIS — E53.8 VITAMIN B12 DEFICIENCY: ICD-10-CM

## 2022-11-21 DIAGNOSIS — Z00.00 ANNUAL PHYSICAL EXAM: Primary | ICD-10-CM

## 2022-11-21 DIAGNOSIS — R73.01 ABNORMAL FASTING GLUCOSE: ICD-10-CM

## 2022-11-21 DIAGNOSIS — I25.10 ASHD (ARTERIOSCLEROTIC HEART DISEASE): ICD-10-CM

## 2022-11-21 DIAGNOSIS — M15.9 PRIMARY OSTEOARTHRITIS INVOLVING MULTIPLE JOINTS: ICD-10-CM

## 2022-11-21 DIAGNOSIS — Z00.00 ENCOUNTER FOR ANNUAL HEALTH EXAMINATION: ICD-10-CM

## 2022-11-21 DIAGNOSIS — G62.9 PERIPHERAL POLYNEUROPATHY: ICD-10-CM

## 2022-11-21 DIAGNOSIS — R97.20 ELEVATED PSA: ICD-10-CM

## 2022-11-21 DIAGNOSIS — Z13.0 SCREENING FOR DEFICIENCY ANEMIA: ICD-10-CM

## 2022-11-21 DIAGNOSIS — I70.0 ATHEROSCLEROSIS OF AORTA (HCC): ICD-10-CM

## 2022-11-21 DIAGNOSIS — K57.30 DIVERTICULOSIS OF COLON: ICD-10-CM

## 2022-11-21 DIAGNOSIS — M48.061 SPINAL STENOSIS OF LUMBAR REGION WITHOUT NEUROGENIC CLAUDICATION: ICD-10-CM

## 2022-11-21 DIAGNOSIS — R74.8 ELEVATED CPK: ICD-10-CM

## 2022-11-21 DIAGNOSIS — E78.00 HYPERCHOLESTEROLEMIA: ICD-10-CM

## 2022-11-21 RX ORDER — DRONEDARONE 400 MG/1
400 TABLET, FILM COATED ORAL 2 TIMES DAILY
COMMUNITY
Start: 2022-11-07

## 2022-11-21 RX ORDER — ASCORBIC ACID 125 MG
1 TABLET,CHEWABLE ORAL DAILY
COMMUNITY
Start: 2022-11-07

## 2022-11-21 NOTE — PATIENT INSTRUCTIONS
- You were seen in clinic for regular annual check-up. We reviewed your most recent set of blood tests. We have placed fasting blood tests and a urine test to be completed prior to your next visit  -We did review your recent cardiology visits with Dr. Shawnee Haney and Dr. Marcus Garcia. You were diagnosed with atrial fibrillation and started on Multaq as well as Xarelto. Continue following with cardiology  -Please continue checking your blood pressures at home and notify us if they are increasing   - Please continue following up with urology, Dr. Emily Rodriguez as your prostate level was elevated. It is OK if you would like to look into other urologist that may provide the Rezum procedure we discussed  - Vaccines you may be due for: We recommended checking with your insurance for coverage of Shingrix, a 2-dose shingles vaccine that can be obtained at the pharmacy if there is adequate coverage through your insurance plan. - Please continue to eat a varied diet including recommended servings of vegetables, fruits, and low fat dairy. Minimize high saturated fats (such as fast foods) and high sugar intake (such as soda)  - We recommend 150 minutes of moderate intensity exercise (brisk walking, swimming) weekly to maintain your current weight. Targeted weight loss will require more vigorous exercise or more than 150 minutes/week. Return to clinic in 6 months for follow-up with Dr. Jonathan Klinefelter   Tests on this list are recommended by your physician but may not be covered, or covered at this frequency, by your insurer. Please check with your insurance carrier before scheduling to verify coverage.    PREVENTATIVE SERVICES FREQUENCY &  COVERAGE DETAILS LAST COMPLETION DATE   Diabetes Screening    Fasting Blood Sugar / Glucose    One screening every 12 months if never tested or if previously tested but not diagnosed with pre-diabetes   One screening every 6 months if diagnosed with pre-diabetes Lab Results   Component Value Date     (H) 11/10/2022        Cardiovascular Disease Screening    Lipid Panel  Cholesterol  Lipoprotein (HDL)  Triglycerides Covered every 5 years for all Medicare beneficiaries without apparent signs or symptoms of cardiovascular disease Lab Results   Component Value Date    CHOLEST 156 11/10/2022    HDL 60 (H) 11/10/2022    LDL 81 11/10/2022    TRIG 77 11/10/2022         Electrocardiogram (EKG)   Covered if needed at Welcome to Medicare, and non-screening if indicated for medical reasons 07/16/2021      Ultrasound Screening for Abdominal Aortic Aneurysm (AAA) Covered once in a lifetime for one of the following risk factors    Men who are 73-68 years old and have ever smoked    Anyone with a family history -     Colorectal Cancer Screening  Covered for ages 52-80; only need ONE of the following:    Colonoscopy   Covered every 10 years    Covered every 2 years if patient is at high risk or previous colonoscopy was abnormal 09/21/2018    No recommendations at this time    Flexible Sigmoidoscopy   Covered every 4 years -    Fecal Occult Blood Test Covered annually -   Prostate Cancer Screening    Prostate-Specific Antigen (PSA) Annually Lab Results   Component Value Date    PSA 4.45 (H) 11/10/2022     PSA due on 11/10/2023   Immunizations    Influenza Covered once per flu season  Please get every year -  No recommendations at this time    Pneumococcal Each vaccine (Semoqro92 & Qlonqyezn83) covered once after 65 Prevnar 13: 12/30/2015    [id number]: 06/21/2017     No recommendations at this time    Hepatitis B One screening covered for patients with certain risk factors   -  No recommendations at this time    Tetanus Toxoid Not covered by Medicare Part B unless medically necessary (cut with metal); may be covered with your pharmacy prescription benefits -    Tetanus, Diptheria and Pertusis TD and TDaP Not covered by Medicare Part B -  No recommendations at this time    Zoster Not covered by Medicare Part B; may be covered with your pharmacy  prescription benefits 03/19/2008  Zoster Vaccines(2 of 3) due on 05/14/2008

## 2022-11-30 ENCOUNTER — OFFICE VISIT (OUTPATIENT)
Dept: NEUROLOGY | Facility: CLINIC | Age: 82
End: 2022-11-30
Payer: MEDICARE

## 2022-11-30 VITALS
HEART RATE: 69 BPM | BODY MASS INDEX: 27.58 KG/M2 | HEIGHT: 71 IN | SYSTOLIC BLOOD PRESSURE: 104 MMHG | DIASTOLIC BLOOD PRESSURE: 68 MMHG | WEIGHT: 197 LBS

## 2022-11-30 DIAGNOSIS — G43.109 MIGRAINE AURA WITHOUT HEADACHE: Primary | ICD-10-CM

## 2022-11-30 PROCEDURE — 99205 OFFICE O/P NEW HI 60 MIN: CPT | Performed by: OTHER

## 2022-11-30 PROCEDURE — 3074F SYST BP LT 130 MM HG: CPT | Performed by: OTHER

## 2022-11-30 PROCEDURE — 3078F DIAST BP <80 MM HG: CPT | Performed by: OTHER

## 2022-11-30 PROCEDURE — 3008F BODY MASS INDEX DOCD: CPT | Performed by: OTHER

## 2023-01-17 ENCOUNTER — TELEPHONE (OUTPATIENT)
Dept: INTERNAL MEDICINE CLINIC | Facility: CLINIC | Age: 83
End: 2023-01-17

## 2023-01-18 RX ORDER — LORAZEPAM 0.5 MG/1
TABLET ORAL
Qty: 90 TABLET | Refills: 1 | Status: SHIPPED | OUTPATIENT
Start: 2023-01-18

## 2023-01-18 NOTE — TELEPHONE ENCOUNTER
To MD:  The above refill request is for a controlled substance. Please review pended medication order. Print and sign for staff to fax to pharmacy or prescribe electronically.     Last office visit: 11/21/22 (Dr. Armida Lott)  Last time refill sent and quantity/refills: 7/15/22 #90/1  [place] 11[place]21[place] #30    To Dr. Armida Lott as you saw him for [facility]

## 2023-04-20 ENCOUNTER — TELEPHONE (OUTPATIENT)
Dept: INTERNAL MEDICINE CLINIC | Facility: CLINIC | Age: 83
End: 2023-04-20

## 2023-04-20 DIAGNOSIS — Z00.00 ANNUAL PHYSICAL EXAM: Primary | ICD-10-CM

## 2023-04-20 NOTE — TELEPHONE ENCOUNTER
Pt scheduled 6 month OV on Aug 3  Requests lab orders with message to   please add tests for vitamin D level, and uric acid level

## 2023-04-21 NOTE — TELEPHONE ENCOUNTER
Vitamin D already ordered. Uric acid lab pended. No history of gout. Unsure of diagnosis for lab.     To Dr. Emily Yoo to please advise--

## 2023-04-24 NOTE — TELEPHONE ENCOUNTER
NOted.  I have approved the order for pt to have Uric Acid level in addition to his Vitamin D level. Please notify pt that the orders are in the system for pt to have performed before he sees me for his Annual Physical exam.   I will route this to nursing.   Thank you!!

## 2023-06-21 ENCOUNTER — TELEPHONE (OUTPATIENT)
Dept: INTERNAL MEDICINE CLINIC | Facility: CLINIC | Age: 83
End: 2023-06-21

## 2023-06-21 NOTE — TELEPHONE ENCOUNTER
Faxed requested items to office as requested. Informed on cover sheet to request anything from old Urologist Dr Davis Primer office for anything they may have ordered as well.

## 2023-06-21 NOTE — TELEPHONE ENCOUNTER
Received a fax from [facility]/DR Gayla Jain's office requesting some recent testing and office notes to be sent to them. Requesting:   Last PSA  Last three urine test results  Recent labs  Recent Imaging  Last office notes    Requesting states this is a stat request as the patient is a new patient to their office. Fax # [phone]  Request placed in Dr Vito Cummings.

## 2023-07-20 ENCOUNTER — LAB ENCOUNTER (OUTPATIENT)
Dept: LAB | Age: 83
End: 2023-07-20
Attending: INTERNAL MEDICINE
Payer: MEDICARE

## 2023-07-20 DIAGNOSIS — Z13.29 SCREENING FOR THYROID DISORDER: ICD-10-CM

## 2023-07-20 DIAGNOSIS — E53.8 VITAMIN B12 DEFICIENCY: ICD-10-CM

## 2023-07-20 DIAGNOSIS — E78.00 HYPERCHOLESTEROLEMIA: ICD-10-CM

## 2023-07-20 DIAGNOSIS — R97.20 ELEVATED PSA: ICD-10-CM

## 2023-07-20 DIAGNOSIS — R74.8 ELEVATED CPK: ICD-10-CM

## 2023-07-20 DIAGNOSIS — I25.10 ASHD (ARTERIOSCLEROTIC HEART DISEASE): ICD-10-CM

## 2023-07-20 DIAGNOSIS — R73.01 ABNORMAL FASTING GLUCOSE: ICD-10-CM

## 2023-07-20 DIAGNOSIS — Z13.0 SCREENING FOR DEFICIENCY ANEMIA: ICD-10-CM

## 2023-07-20 DIAGNOSIS — N40.0 BENIGN NON-NODULAR PROSTATIC HYPERPLASIA WITHOUT LOWER URINARY TRACT SYMPTOMS: ICD-10-CM

## 2023-07-20 DIAGNOSIS — E55.9 VITAMIN D DEFICIENCY: ICD-10-CM

## 2023-07-20 DIAGNOSIS — Z00.00 ANNUAL PHYSICAL EXAM: ICD-10-CM

## 2023-07-20 LAB
ALBUMIN SERPL-MCNC: 3.4 G/DL (ref 3.4–5)
ALBUMIN/GLOB SERPL: 1 {RATIO} (ref 1–2)
ALP LIVER SERPL-CCNC: 58 U/L
ALT SERPL-CCNC: 35 U/L
ANION GAP SERPL CALC-SCNC: 8 MMOL/L (ref 0–18)
AST SERPL-CCNC: 32 U/L (ref 15–37)
BASOPHILS # BLD AUTO: 0.03 X10(3) UL (ref 0–0.2)
BASOPHILS NFR BLD AUTO: 0.5 %
BILIRUB SERPL-MCNC: 0.7 MG/DL (ref 0.1–2)
BILIRUB UR QL: NEGATIVE
BUN BLD-MCNC: 16 MG/DL (ref 7–18)
BUN/CREAT SERPL: 14.7 (ref 10–20)
CALCIUM BLD-MCNC: 9.1 MG/DL (ref 8.5–10.1)
CHLORIDE SERPL-SCNC: 110 MMOL/L (ref 98–112)
CHOLEST SERPL-MCNC: 130 MG/DL (ref ?–200)
CK SERPL-CCNC: 616 U/L
CLARITY UR: CLEAR
CO2 SERPL-SCNC: 24 MMOL/L (ref 21–32)
COLOR UR: YELLOW
COMPLEXED PSA SERPL-MCNC: 2.83 NG/ML (ref ?–4)
CREAT BLD-MCNC: 1.09 MG/DL
DEPRECATED RDW RBC AUTO: 46.1 FL (ref 35.1–46.3)
EGFRCR SERPLBLD CKD-EPI 2021: 69 ML/MIN/1.73M2 (ref 60–?)
EOSINOPHIL # BLD AUTO: 0.36 X10(3) UL (ref 0–0.7)
EOSINOPHIL NFR BLD AUTO: 5.7 %
ERYTHROCYTE [DISTWIDTH] IN BLOOD BY AUTOMATED COUNT: 13.2 % (ref 11–15)
EST. AVERAGE GLUCOSE BLD GHB EST-MCNC: 131 MG/DL (ref 68–126)
FASTING PATIENT LIPID ANSWER: YES
FASTING STATUS PATIENT QL REPORTED: YES
GLOBULIN PLAS-MCNC: 3.5 G/DL (ref 2.8–4.4)
GLUCOSE BLD-MCNC: 103 MG/DL (ref 70–99)
GLUCOSE UR-MCNC: NORMAL MG/DL
HBA1C MFR BLD: 6.2 % (ref ?–5.7)
HCT VFR BLD AUTO: 40.8 %
HDLC SERPL-MCNC: 50 MG/DL (ref 40–59)
HGB BLD-MCNC: 13.8 G/DL
HGB UR QL STRIP.AUTO: NEGATIVE
IMM GRANULOCYTES # BLD AUTO: 0.02 X10(3) UL (ref 0–1)
IMM GRANULOCYTES NFR BLD: 0.3 %
KETONES UR-MCNC: NEGATIVE MG/DL
LDLC SERPL CALC-MCNC: 62 MG/DL (ref ?–100)
LEUKOCYTE ESTERASE UR QL STRIP.AUTO: NEGATIVE
LYMPHOCYTES # BLD AUTO: 1.86 X10(3) UL (ref 1–4)
LYMPHOCYTES NFR BLD AUTO: 29.4 %
MCH RBC QN AUTO: 32.1 PG (ref 26–34)
MCHC RBC AUTO-ENTMCNC: 33.8 G/DL (ref 31–37)
MCV RBC AUTO: 94.9 FL
MONOCYTES # BLD AUTO: 0.81 X10(3) UL (ref 0.1–1)
MONOCYTES NFR BLD AUTO: 12.8 %
NEUTROPHILS # BLD AUTO: 3.24 X10 (3) UL (ref 1.5–7.7)
NEUTROPHILS # BLD AUTO: 3.24 X10(3) UL (ref 1.5–7.7)
NEUTROPHILS NFR BLD AUTO: 51.3 %
NITRITE UR QL STRIP.AUTO: NEGATIVE
NONHDLC SERPL-MCNC: 80 MG/DL (ref ?–130)
OSMOLALITY SERPL CALC.SUM OF ELEC: 295 MOSM/KG (ref 275–295)
PH UR: 5.5 [PH] (ref 5–8)
PLATELET # BLD AUTO: 193 10(3)UL (ref 150–450)
POTASSIUM SERPL-SCNC: 4.1 MMOL/L (ref 3.5–5.1)
PROT SERPL-MCNC: 6.9 G/DL (ref 6.4–8.2)
PROT UR-MCNC: NEGATIVE MG/DL
RBC # BLD AUTO: 4.3 X10(6)UL
SODIUM SERPL-SCNC: 142 MMOL/L (ref 136–145)
SP GR UR STRIP: 1.01 (ref 1–1.03)
TRIGL SERPL-MCNC: 97 MG/DL (ref 30–149)
TSI SER-ACNC: 1.4 MIU/ML (ref 0.36–3.74)
URATE SERPL-MCNC: 6.7 MG/DL
UROBILINOGEN UR STRIP-ACNC: NORMAL
VIT B12 SERPL-MCNC: 405 PG/ML (ref 193–986)
VIT D+METAB SERPL-MCNC: 42.3 NG/ML (ref 30–100)
VLDLC SERPL CALC-MCNC: 14 MG/DL (ref 0–30)
WBC # BLD AUTO: 6.3 X10(3) UL (ref 4–11)

## 2023-07-20 PROCEDURE — 82550 ASSAY OF CK (CPK): CPT

## 2023-07-20 PROCEDURE — 82306 VITAMIN D 25 HYDROXY: CPT

## 2023-07-20 PROCEDURE — 82607 VITAMIN B-12: CPT

## 2023-07-20 PROCEDURE — 80061 LIPID PANEL: CPT

## 2023-07-20 PROCEDURE — 83036 HEMOGLOBIN GLYCOSYLATED A1C: CPT

## 2023-07-20 PROCEDURE — 84443 ASSAY THYROID STIM HORMONE: CPT

## 2023-07-20 PROCEDURE — 85025 COMPLETE CBC W/AUTO DIFF WBC: CPT

## 2023-07-20 PROCEDURE — 80053 COMPREHEN METABOLIC PANEL: CPT

## 2023-07-20 PROCEDURE — 84550 ASSAY OF BLOOD/URIC ACID: CPT

## 2023-07-20 PROCEDURE — 36415 COLL VENOUS BLD VENIPUNCTURE: CPT

## 2023-07-25 NOTE — ADDENDUM NOTE
Addended by: Taylor Taylor on: 12/12/2018 08:39 PM     Modules accepted: Level of Service SIXTO  I will let [place] know this

## 2023-07-31 ENCOUNTER — TELEPHONE (OUTPATIENT)
Dept: INTERNAL MEDICINE CLINIC | Facility: CLINIC | Age: 83
End: 2023-07-31

## 2023-07-31 DIAGNOSIS — R05.1 ACUTE COUGH: Primary | ICD-10-CM

## 2023-07-31 RX ORDER — AZITHROMYCIN 250 MG/1
TABLET, FILM COATED ORAL
Qty: 6 TABLET | Refills: 1 | Status: SHIPPED | OUTPATIENT
Start: 2023-07-31 | End: 2023-08-05

## 2023-07-31 NOTE — TELEPHONE ENCOUNTER
Please call patient  He has had chest cold for about a week, started last Sunday and was in [facility] last week  No fever, settled in chest more, deep cough, no phlgem  Requesting xray to be sure he doesn't have walking pneumonia  Please call to discuss/advise  Tasked to nursing

## 2023-07-31 NOTE — TELEPHONE ENCOUNTER
Spoke with patient states on 7/23 he started having deep productive cough, with clear/yellowish phlegm, PND, nasal drainage. Patient state he had diarrhea that day, but nothing since. He states he has been taking vitamin C, zinc, benadryl, and Claritin when helps a little but does not stop symptoms. Denies fever, chills, night sweats, sore throat, sob, wheezing, chest pain, nausea, vomiting, headache, facial pain, ear pain. In [facility]) last week, no sick contacts. Patient is not vaccinated for Covid. Patient wants to know if you would order a chest x-ray to make sure he does not have pneumonia due to cough.  Advised covid test.   Awaiting covid results        [employer]

## 2023-08-01 ENCOUNTER — TELEPHONE (OUTPATIENT)
Dept: INTERNAL MEDICINE CLINIC | Facility: CLINIC | Age: 83
End: 2023-08-01

## 2023-08-01 ENCOUNTER — HOSPITAL ENCOUNTER (OUTPATIENT)
Dept: GENERAL RADIOLOGY | Facility: HOSPITAL | Age: 83
Discharge: HOME | End: 2023-08-01
Attending: INTERNAL MEDICINE
Payer: MEDICARE

## 2023-08-01 DIAGNOSIS — R05.1 ACUTE COUGH: ICD-10-CM

## 2023-08-01 PROCEDURE — 71046 X-RAY EXAM CHEST 2 VIEWS: CPT | Performed by: INTERNAL MEDICINE

## 2023-08-01 NOTE — TELEPHONE ENCOUNTER
Telephone call to patient and situation discussed. Patient was up in [place] with 3 of his grandsons fishing. Patient came down with cough while up there on the day he was leaving. Patient's had a cough and chest congestion for the past week. He has no fever or chills. I will place an order in system for the patient to get a chest x-ray. I will also start the patient on Z-Law/Zithromax to take. I will give him 1 refill to use as necessary. Patient is to push fluids. He may use Robitussin or Mucinex as necessary. He may use Tylenol as necessary.

## 2023-08-02 NOTE — TELEPHONE ENCOUNTER
I have reviewed pt's Chest X-ray report. Please notify pt that his Chest Xray is clear. No pneumonia is noted. I will route this to nursing.   Thank you!!

## 2023-08-02 NOTE — TELEPHONE ENCOUNTER
Patient called and relayed Dr Roberta Heaton message. Patient verbalized understanding with no further questions noted.

## 2023-08-06 ENCOUNTER — TELEPHONE (OUTPATIENT)
Dept: INTERNAL MEDICINE CLINIC | Facility: CLINIC | Age: 83
End: 2023-08-06

## 2023-08-08 RX ORDER — LORAZEPAM 0.5 MG/1
TABLET ORAL
Qty: 90 TABLET | Refills: 1 | Status: SHIPPED | OUTPATIENT
Start: 2023-08-08

## 2023-08-08 NOTE — TELEPHONE ENCOUNTER
To MD:  The above refill request is for a controlled substance. Please review pended medication order. Print and sign for staff to fax to pharmacy or prescribe electronically.     Last office visit: 11/21/22  Last time refill sent and quantity/refills:   Last ordered 1/18/23 #90/1  Per [place]  last dispensed 7/11/23 #30/0

## 2023-08-09 ENCOUNTER — OFFICE VISIT (OUTPATIENT)
Dept: INTERNAL MEDICINE CLINIC | Facility: CLINIC | Age: 83
End: 2023-08-09

## 2023-08-09 VITALS
SYSTOLIC BLOOD PRESSURE: 104 MMHG | HEART RATE: 92 BPM | TEMPERATURE: 99 F | BODY MASS INDEX: 28.07 KG/M2 | DIASTOLIC BLOOD PRESSURE: 70 MMHG | OXYGEN SATURATION: 97 % | HEIGHT: 71 IN | WEIGHT: 200.5 LBS

## 2023-08-09 DIAGNOSIS — R73.01 ABNORMAL FASTING GLUCOSE: ICD-10-CM

## 2023-08-09 DIAGNOSIS — M48.061 SPINAL STENOSIS OF LUMBAR REGION WITHOUT NEUROGENIC CLAUDICATION: ICD-10-CM

## 2023-08-09 DIAGNOSIS — Z86.010 HISTORY OF ADENOMATOUS POLYP OF COLON: ICD-10-CM

## 2023-08-09 DIAGNOSIS — G62.9 PERIPHERAL POLYNEUROPATHY: ICD-10-CM

## 2023-08-09 DIAGNOSIS — N40.0 BENIGN NON-NODULAR PROSTATIC HYPERPLASIA WITHOUT LOWER URINARY TRACT SYMPTOMS: ICD-10-CM

## 2023-08-09 DIAGNOSIS — R97.20 ELEVATED PSA: ICD-10-CM

## 2023-08-09 DIAGNOSIS — I48.0 PAROXYSMAL ATRIAL FIBRILLATION (HCC): ICD-10-CM

## 2023-08-09 DIAGNOSIS — E78.00 HYPERCHOLESTEROLEMIA: ICD-10-CM

## 2023-08-09 DIAGNOSIS — I70.0 ATHEROSCLEROSIS OF AORTA (HCC): ICD-10-CM

## 2023-08-09 DIAGNOSIS — R53.83 FATIGUE, UNSPECIFIED TYPE: ICD-10-CM

## 2023-08-09 DIAGNOSIS — M15.9 PRIMARY OSTEOARTHRITIS INVOLVING MULTIPLE JOINTS: ICD-10-CM

## 2023-08-09 DIAGNOSIS — R74.8 ELEVATED CPK: ICD-10-CM

## 2023-08-09 DIAGNOSIS — I25.10 ASHD (ARTERIOSCLEROTIC HEART DISEASE): Primary | ICD-10-CM

## 2023-08-09 DIAGNOSIS — K57.30 DIVERTICULOSIS OF COLON: ICD-10-CM

## 2023-08-09 DIAGNOSIS — E11.9 TYPE 2 DIABETES MELLITUS WITHOUT COMPLICATION, WITHOUT LONG-TERM CURRENT USE OF INSULIN (HCC): ICD-10-CM

## 2023-08-09 PROCEDURE — 1159F MED LIST DOCD IN RCRD: CPT | Performed by: INTERNAL MEDICINE

## 2023-08-09 PROCEDURE — 3008F BODY MASS INDEX DOCD: CPT | Performed by: INTERNAL MEDICINE

## 2023-08-09 PROCEDURE — 1160F RVW MEDS BY RX/DR IN RCRD: CPT | Performed by: INTERNAL MEDICINE

## 2023-08-09 PROCEDURE — 3078F DIAST BP <80 MM HG: CPT | Performed by: INTERNAL MEDICINE

## 2023-08-09 PROCEDURE — 1126F AMNT PAIN NOTED NONE PRSNT: CPT | Performed by: INTERNAL MEDICINE

## 2023-08-09 PROCEDURE — 99214 OFFICE O/P EST MOD 30 MIN: CPT | Performed by: INTERNAL MEDICINE

## 2023-08-09 PROCEDURE — 3074F SYST BP LT 130 MM HG: CPT | Performed by: INTERNAL MEDICINE

## 2023-08-09 NOTE — PATIENT INSTRUCTIONS
Patient is to continue his current diet, medication and activity. Patient will continue to follow-up with his cardiologist's, Dr. Darian Phelps and Dr. Tasha Guevara. Patient still has paroxysmal atrial fibrillation. I will plan to see the patient back in about 3 months with blood test that will include a BMP, hemoglobin A1c, lipid panel, AST, ALT and CPK. When I see patient back in 3 months that we will be here for his annual physical examination. I will encourage the patient to obtain the flu vaccine this autumn and, when available, receive the COVID-vaccine also. Patient should also consider getting the Shingrix vaccine at his pharmacy also.

## 2023-09-07 NOTE — TELEPHONE ENCOUNTER
Noted.  I have reviewed my schedule. Please call patient and notify him that I can see him on Thursday, September 21, at 4:30 PM.  Please tell him that if something opens up earlier in the day we will try to move the appointment up. I will forward this message to the .   Thank you!!

## 2023-09-07 NOTE — TELEPHONE ENCOUNTER
Pt called to schedule pre op clearance for Oct 3 shoulder replacement surgery w/Dr Gaurav Montiel    Patient advises he does have cardiac clearance from cardiologist, Dr Shawnee Haney     Please advise where when pt can be added to your schedule     Message routed to Dr Flaquito Peters

## 2023-09-20 NOTE — PATIENT INSTRUCTIONS
Patient is to continue his current diet, medication and activity. Patient appears stable medically and should be able to tolerate proposed shoulder surgery. Patient is medically cleared for proposed surgery. Patient has received cardiology clearance from his cardiologist, Dr. Xiomy Irwin. I have placed orders in the system for the patient have a CBC, CMP, hemoglobin A1c and lipid panel. I will tentatively plan to see the patient back in about 3 or 4 months.

## 2023-09-20 NOTE — TELEPHONE ENCOUNTER
Telephone call to patient. Patient was currently scheduled to see me tomorrow at 430 for preop physical.  I contacted patient today to see if he was available to see me at 3:00 this afternoon as I had a cancellation. Patient is available and will plan to come in today for his preoperative history and physical examination.

## 2023-09-29 NOTE — TELEPHONE ENCOUNTER
Telephone call to pt to notify pt that his blood tests have turned out well. OK for surgery. Pt is to call me a few weeks or month prior to his next office visit for me to place order in the system prior to his next office visit.

## 2023-10-03 NOTE — PLAN OF CARE
Post-op day #0. Dressing in place to Rt Shoulder. Kerry Mould in place. Monitoring vital signs- stable at this time. No acute changes noted throughout shift. Receiving IV fluids per MD order. Tolerating diet. Patient is a check void. SCDs and Xaralto for DVT prophylaxis. Pain medication provided as needed. Encouraged frequent ambulation/position changes and use of incentive spirometer. Fall precautions maintained- bed alarm on, bed locked in lowest position, call light and personal belongings within reach, non-skid socks in place to bilateral feet. Frequent rounding by nursing staff. Plan to discharge home once medically cleared.         Problem: Patient Centered Care  Goal: Patient preferences are identified and integrated in the patient's plan of care  Description: Interventions:  - What would you like us to know as we care for you?   - Provide timely, complete, and accurate information to patient/family  - Incorporate patient and family knowledge, values, beliefs, and cultural backgrounds into the planning and delivery of care  - Encourage patient/family to participate in care and decision-making at the level they choose  - Honor patient and family perspectives and choices  Outcome: Progressing     Problem: Patient/Family Goals  Goal: Patient/Family Long Term Goal  Description: Patient's Long Term Goal:     Interventions:  - See additional Care Plan goals for specific interventions  Outcome: Progressing  Goal: Patient/Family Short Term Goal  Description: Patient's Short Term Goal:     Interventions:   - See additional Care Plan goals for specific interventions  Outcome: Progressing     Problem: PAIN - ADULT  Goal: Verbalizes/displays adequate comfort level or patient's stated pain goal  Description: INTERVENTIONS:  - Encourage pt to monitor pain and request assistance  - Assess pain using appropriate pain scale  - Administer analgesics based on type and severity of pain and evaluate response  - Implement non-pharmacological measures as appropriate and evaluate response  - Consider cultural and social influences on pain and pain management  - Manage/alleviate anxiety  - Utilize distraction and/or relaxation techniques  - Monitor for opioid side effects  - Notify MD/LIP if interventions unsuccessful or patient reports new pain  - Anticipate increased pain with activity and pre-medicate as appropriate  Outcome: Progressing     Problem: RISK FOR INFECTION - ADULT  Goal: Absence of fever/infection during anticipated neutropenic period  Description: INTERVENTIONS  - Monitor WBC  - Administer growth factors as ordered  - Implement neutropenic guidelines  Outcome: Progressing     Problem: SAFETY ADULT - FALL  Goal: Free from fall injury  Description: INTERVENTIONS:  - Assess pt frequently for physical needs  - Identify cognitive and physical deficits and behaviors that affect risk of falls.   - [place] fall precautions as indicated by assessment.  - Educate pt/family on patient safety including physical limitations  - Instruct pt to call for assistance with activity based on assessment  - Modify environment to reduce risk of injury  - Provide assistive devices as appropriate  - Consider OT/PT consult to assist with strengthening/mobility  - Encourage toileting schedule  Outcome: Progressing     Problem: DISCHARGE PLANNING  Goal: Discharge to home or other facility with appropriate resources  Description: INTERVENTIONS:  - Identify barriers to discharge w/pt and caregiver  - Include patient/family/discharge partner in discharge planning  - Arrange for needed discharge resources and transportation as appropriate  - Identify discharge learning needs (meds, wound care, etc)  - Arrange for interpreters to assist at discharge as needed  - Consider post-discharge preferences of patient/family/discharge partner  - Complete POLST form as appropriate  - Assess patient's ability to be responsible for managing their own health  - Refer to Case Management Department for coordinating discharge planning if the patient needs post-hospital services based on physician/LIP order or complex needs related to functional status, cognitive ability or social support system  Outcome: Progressing

## 2023-10-03 NOTE — ANESTHESIA PROCEDURE NOTES
Peripheral Block    Date/Time: 10/3/2023 10:44 AM    Performed by: Sincere Damico MD  Authorized by: Sincere Damico MD      General Information and Staff    Start Time:  10/3/2023 10:44 AM  End Time:  10/3/2023 10:47 AM  Anesthesiologist:  Sincere Damico MD  Performed by: Anesthesiologist  Patient Location:  PACU    Block Placement: Pre Induction  Site Identification: real time ultrasound guided and image stored and retrievable    Block site/laterality marked before start: site marked  Reason for Block: at surgeon's request and post-op pain management    Preanesthetic Checklist: 2 patient identifers, IV checked, risks and benefits discussed, monitors and equipment checked, pre-op evaluation, timeout performed, anesthesia consent, sterile technique used, no prohibitive neurological deficits and no local skin infection at insertion site      Procedure Details    Patient Position:  Supine  Prep: ChloraPrep    Monitoring:  Heart rate, cardiac monitor, continuous pulse ox and blood pressure cuff  Block Type: Interscalene  Laterality:  Right  Injection Technique:  Single-shot    Needle    Needle Type:  Short-bevel and echogenic  Needle Gauge:  22 G  Needle Length:  50 mm  Needle Localization:  Ultrasound guidance  Reason for Ultrasound Use: appropriate spread of the medication was noted in real time and no ultrasound evidence of intravascular and/or intraneural injection            Assessment    Injection Assessment:  Good spread noted, incremental injection, low pressure, negative aspiration for heme, negative resistance and local visualized surrounding nerve on ultrasound  - Patient tolerated block procedure well without evidence of immediate block related complications.      Medications  10/3/2023 10:44 AM  midazolam (VERSED)injection 2mg/2ml - Intravenous   1 mg - 10/3/2023 10:44:00 AM  lidocaine injection 1% - Intradermal   5 mL - 10/3/2023 10:44:00 AM  ropivacaine (NAROPIN) injection 0.5% - Infiltration   30 mL - 10/3/2023 10:44:00 AM  dexamethasone (DECADRON) PF injection 10 mg/ml - Injection   4 mg - 10/3/2023 10:44:00 AM    Additional Comments    Good image, atraumatic

## 2023-10-03 NOTE — INTERVAL H&P NOTE
Pre-op Diagnosis: Glenohumeral arthritis right    The above referenced H&P was reviewed by Nicolas Mo PA-C on 10/3/2023, the patient was examined and no significant changes have occurred in the patient's condition since the H&P was performed. I discussed with the patient and/or legal representative the potential benefits, risks and side effects of this procedure; the likelihood of the patient achieving goals; and potential problems that might occur during recuperation. I discussed reasonable alternatives to the procedure, including risks, benefits and side effects related to the alternatives and risks related to not receiving this procedure. We will proceed with procedure as planned.

## 2023-10-03 NOTE — ANESTHESIA PROCEDURE NOTES
Airway  Date/Time: 10/3/2023 11:40 AM  Urgency: Elective    Airway not difficult    General Information and Staff    Patient location during procedure: OR  Anesthesiologist: Michele Aj MD  Performed: anesthesiologist   Performed by: Michele Aj MD  Authorized by: Michele Aj MD      Indications and Patient Condition  Indications for airway management: anesthesia  Spontaneous ventilation: present  Sedation level: deep  Preoxygenated: yes  Patient position: sniffing  Mask difficulty assessment: 1 - vent by mask    Final Airway Details  Final airway type: endotracheal airway      Successful airway: ETT  Cuffed: yes   Successful intubation technique: direct laryngoscopy  Endotracheal tube insertion site: oral  Blade: GlideScope  Blade size: #3  ETT size (mm): 7.5    Cormack-Lehane Classification: grade I - full view of glottis  Placement verified by: capnometry   Measured from: teeth  ETT to teeth (cm): 24  Number of attempts at approach: 1

## 2023-10-04 NOTE — PLAN OF CARE
Post-op day #1. Dressing in place to Rt Shoulder. Sling immobilizer in place. Monitoring vital signs- stable at this time. No acute changes noted throughout shift. Tolerating diet. Voiding up to bathroom or by urinal. SCDs and Xaralto for DVT prophylaxis. Pain medication provided as needed. Up with standby assist and a walker. Encouraged frequent ambulation and use of incentive spirometer. Fall precautions maintained- bed alarm on, bed locked in lowest position, call light and personal belongings within reach, non-skid socks in place to bilateral feet. Frequent rounding by nursing staff. Plan to discharge home once medically cleared. Patient cleared by internal medicine, ortho surgery, PT/OT, and social work. [place]. Verified that pain medications are at patient's pharmacy. IV removed, discharge education provided, patient sent home with all personal belongings, scripts, and discharge instructions. Addressed additional questions.         Problem: Patient Centered Care  Goal: Patient preferences are identified and integrated in the patient's plan of care  Description: Interventions:  - What would you like us to know as we care for you?   - Provide timely, complete, and accurate information to patient/family  - Incorporate patient and family knowledge, values, beliefs, and cultural backgrounds into the planning and delivery of care  - Encourage patient/family to participate in care and decision-making at the level they choose  - Honor patient and family perspectives and choices  Outcome: Adequate for Discharge     Problem: Patient/Family Goals  Goal: Patient/Family Long Term Goal  Description: Patient's Long Term Goal:     Interventions:  - See additional Care Plan goals for specific interventions  Outcome: Adequate for Discharge  Goal: Patient/Family Short Term Goal  Description: Patient's Short Term Goal:     Interventions:   - See additional Care Plan goals for specific interventions  Outcome: Adequate for Discharge     Problem: PAIN - ADULT  Goal: Verbalizes/displays adequate comfort level or patient's stated pain goal  Description: INTERVENTIONS:  - Encourage pt to monitor pain and request assistance  - Assess pain using appropriate pain scale  - Administer analgesics based on type and severity of pain and evaluate response  - Implement non-pharmacological measures as appropriate and evaluate response  - Consider cultural and social influences on pain and pain management  - Manage/alleviate anxiety  - Utilize distraction and/or relaxation techniques  - Monitor for opioid side effects  - Notify MD/LIP if interventions unsuccessful or patient reports new pain  - Anticipate increased pain with activity and pre-medicate as appropriate  Outcome: Adequate for Discharge     Problem: RISK FOR INFECTION - ADULT  Goal: Absence of fever/infection during anticipated neutropenic period  Description: INTERVENTIONS  - Monitor WBC  - Administer growth factors as ordered  - Implement neutropenic guidelines  Outcome: Adequate for Discharge     Problem: SAFETY ADULT - FALL  Goal: Free from fall injury  Description: INTERVENTIONS:  - Assess pt frequently for physical needs  - Identify cognitive and physical deficits and behaviors that affect risk of falls.   - [place] fall precautions as indicated by assessment.  - Educate pt/family on patient safety including physical limitations  - Instruct pt to call for assistance with activity based on assessment  - Modify environment to reduce risk of injury  - Provide assistive devices as appropriate  - Consider OT/PT consult to assist with strengthening/mobility  - Encourage toileting schedule  Outcome: Adequate for Discharge     Problem: DISCHARGE PLANNING  Goal: Discharge to home or other facility with appropriate resources  Description: INTERVENTIONS:  - Identify barriers to discharge w/pt and caregiver  - Include patient/family/discharge partner in discharge planning  - Arrange for needed discharge resources and transportation as appropriate  - Identify discharge learning needs (meds, wound care, etc)  - Arrange for interpreters to assist at discharge as needed  - Consider post-discharge preferences of patient/family/discharge partner  - Complete POLST form as appropriate  - Assess patient's ability to be responsible for managing their own health  - Refer to Case Management Department for coordinating discharge planning if the patient needs post-hospital services based on physician/LIP order or complex needs related to functional status, cognitive ability or social support system  Outcome: Adequate for Discharge

## 2023-10-04 NOTE — PLAN OF CARE
Pt. Is POD # 1. A&O x4 on RA. Sling/immobilizer in place to [address]. States tingling/numbness in hands is improving. Scheduled tylenol and PRN oxy given for pain. Up standby assist with walker, voiding freely. Denies nausea. Saline locked. SCD's, xarelto, and YUNIOR's for DVT prophylaxis. Call light within reach.      Problem: Patient Centered Care  Goal: Patient preferences are identified and integrated in the patient's plan of care  Description: Interventions:  - What would you like us to know as we care for you?   - Provide timely, complete, and accurate information to patient/family  - Incorporate patient and family knowledge, values, beliefs, and cultural backgrounds into the planning and delivery of care  - Encourage patient/family to participate in care and decision-making at the level they choose  - Honor patient and family perspectives and choices  Outcome: Progressing     Problem: Patient/Family Goals  Goal: Patient/Family Long Term Goal  Description: Patient's Long Term Goal:     Interventions:  -   - See additional Care Plan goals for specific interventions  Outcome: Progressing  Goal: Patient/Family Short Term Goal  Description: Patient's Short Term Goal:     Interventions:   -   - See additional Care Plan goals for specific interventions  Outcome: Progressing     Problem: PAIN - ADULT  Goal: Verbalizes/displays adequate comfort level or patient's stated pain goal  Description: INTERVENTIONS:  - Encourage pt to monitor pain and request assistance  - Assess pain using appropriate pain scale  - Administer analgesics based on type and severity of pain and evaluate response  - Implement non-pharmacological measures as appropriate and evaluate response  - Consider cultural and social influences on pain and pain management  - Manage/alleviate anxiety  - Utilize distraction and/or relaxation techniques  - Monitor for opioid side effects  - Notify MD/LIP if interventions unsuccessful or patient reports new pain  - Anticipate increased pain with activity and pre-medicate as appropriate  Outcome: Progressing     Problem: RISK FOR INFECTION - ADULT  Goal: Absence of fever/infection during anticipated neutropenic period  Description: INTERVENTIONS  - Monitor WBC  - Administer growth factors as ordered  - Implement neutropenic guidelines  Outcome: Progressing     Problem: SAFETY ADULT - FALL  Goal: Free from fall injury  Description: INTERVENTIONS:  - Assess pt frequently for physical needs  - Identify cognitive and physical deficits and behaviors that affect risk of falls.   - [place] fall precautions as indicated by assessment.  - Educate pt/family on patient safety including physical limitations  - Instruct pt to call for assistance with activity based on assessment  - Modify environment to reduce risk of injury  - Provide assistive devices as appropriate  - Consider OT/PT consult to assist with strengthening/mobility  - Encourage toileting schedule  Outcome: Progressing     Problem: DISCHARGE PLANNING  Goal: Discharge to home or other facility with appropriate resources  Description: INTERVENTIONS:  - Identify barriers to discharge w/pt and caregiver  - Include patient/family/discharge partner in discharge planning  - Arrange for needed discharge resources and transportation as appropriate  - Identify discharge learning needs (meds, wound care, etc)  - Arrange for interpreters to assist at discharge as needed  - Consider post-discharge preferences of patient/family/discharge partner  - Complete POLST form as appropriate  - Assess patient's ability to be responsible for managing their own health  - Refer to Case Management Department for coordinating discharge planning if the patient needs post-hospital services based on physician/LIP order or complex needs related to functional status, cognitive ability or social support system  Outcome: Progressing

## 2023-10-30 NOTE — OPERATIVE REPORT
Patient Name: Berhane Abdi    Procedure Date: 10/3/2023     : 1940    MRN: [id number]    Pre-Operative Diagnosis:   Glenohumeral arthritis right  Right shoulder pain     Post-Operative Diagnosis:   Glenohumeral arthritis right  Right shoulder pain     Surgeon(s) and Role:     Krunal Angel MD - Primary  Assistant(s):  Gifty Eng PA-C     Procedure Performed:   1.  right Reverse Total Shoulder Arthroplasty (14798)    Brenda Root PA-C  2.  right Reverse Total Shoulder Arthroplasty, first assist (23472-AS)    Anesthesia: General; regional; local     Complications: No    Surgical Findings: see below     Specimen: None     Estimated Blood Loss: 250 mL    Condition: Stable to post anesthesia care unit    Operative Indication: Berhane Abdi is a [de-identified]year old male who has had right shoulder pain for years. Pain affects his sleep and activities. Feels grinding with planks. Pain at night is gotten worse. History of A-fib. The patient failed conservative management and given their known glenohumeral arthritis seen on plain radiographs, as well as concern with regards to his future rotator cuff function, a reverse total shoulder arthroplasty was recommended. 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 The procedure as well as its potential risks and postoperative management were discussed. All questions were answered, the patient understands the potential risks, and would like to proceed with the surgical treatment. Operative Procedure: The patient was seen in the preoperative holding area where history, physical examination, and radiographic studies were reviewed and the decision was made to proceed with surgery. The operative shoulder was signed. The patient then underwent a regional block as performed by the anesthesia team using ultrasound guidance.  The patient was brought back to the operating room, identified, and placed on the operating table. General endotracheal anesthesia was administered. The patient was then placed into a modified beachchair position and the operative shoulder was prepped and draped in usual sterile fashion. Surgical landmarks were outlined on the skin. A surgical timeout confirming the patient's name, date of birth, and operative extremity was performed and all in the room agreed. The area of the incision was anesthetized with local anesthetic. Pre-operative antibiotics were given along with 1g of TXA. A standard deltopectoral incision was made. The cephalic vein was preserved and taken laterally. The delto-pectoral interval was split and the superior 1/3rd aspect of the pectoralis major insertion was released to aid in exposure. The clavipectoral fascia was incised and the anterior circumflex vessels were tied with #1 vicryl to decrease blood loss. The long biceps tendon was tenodesed to the superior aspect of the pectoralis major tendon with two #2 FiberWire in a figure-of-eight fashion. Excess biceps tendon proximally was removed. Attention was directed towards the exposure of the glenohumeral joint. The subscapularis was peeled directly off from the lesser tuberosity and tagged with two #2 FiberWire sutures. A capsular release was conducted on the humeral side past the 6 o'clock position and then on the glenoid side from the 12:00 down to the 6 o'clock position. The humerus was dislocated from the glenoid. Osteophytes were removed from the neck of the humerus. The guidepin was placed down the canal followed by a cannulated drill and then a hand-held reamer to identify the humeral axis. The head cut was made with 135 degrees of inclination and approximately 20 degrees of retroversion using the guide. A head cut protector was placed. Attention was directed toward the glenoid side. A 360 degree release of the glenoid was performed.  An elevator was used to remove the remaining articular cartilage from the glenoid. The [employer] VIP guide based on the preoperative CT scan was used to place a 2.8 mm guidepin in appropriate position. A 20 degree wedge at the 12 oclock aspect of the glenoid showed appropriate fit . The face of the glenoid was first reamed with the central reamer followed by the peripheral reamer. The central peg hole was drilled for 35 mm post. The final 20-degree augmented glenoid baseplate with +2 mm offset and  35 mm post was impacted into position. The baseplate was fixed with screws with nonlocking screws inferiorly and superiorly and locking screws anterior and posteriorly. A size 39 mm glenosphere with a +4 mm lateral offset which created a total of +6 mm offset was now impacted into a secure and stable position and secured with the locking screw. Soft tissue and bony debris were irrigated out thoroughly and this completed the glenoid side of the surgery. Attention was redirected towards the humeral side. The canal was reamed up until appropriate resistance was felt. The humeral canal was then broached up to the above-noted size which had excellent rotational stability. A 135 degree, right offset cup was selected. A +6 mm spacer provided excellent stability as well as mobility of the glenohumeral joint. The shoulder was irrigated out thoroughly. The trial components were removed from the humeral side. #5 FiberWire was placed at the rim of the osteotomy for subscapularis repair. Graft from the resected humeral head was placed into the calcar to allow for bony ingrowth. The final implant implant was impacted into a secure and stable position. The polyethylene liner was impacted and secured, then checked and found to be appropriately engaged. The humerus was reduced onto the glenosphere again with excellent tension and proper stability. The #5 FiberWire sutures were used to repair the subscapularis with the arm held at least 40 degrees of external rotation. .The shoulder was then irrigated out thoroughly using a diluted Betadine solution to help sterilize the shoulder. 1 g of vancomycin powder was placed into the deep surgical site. The deltopectoral interval was closed with #2 Fiberwire in an interrupted fashion. The wound was then closed in layers with 2-0 Monocryl followed by a 3-0 running subcuticular closure of the skin with Steri-Strips. A sterile Aquacel dressing was placed. The arm was placed into a sling with abduction pillow. The patient was extubated and returned to recovery in stable condition. Dorie Lynch PA-C assisted with all aspects of the surgical procedure and was critical in performing the preoperative evaluation, positioning, and assisting during surgery. Postoperative plan:   The patient will be seen back in the office in 7 to 10 days for dressing removal and wound check. The patient was given a prescription for pain medication and antinausea medication. .   The intraoperative findings and postoperative plan will be discussed with the patient and their family. The patient is allowed elbow wrist and hand range of motion will but will hold from any significant shoulder motion until they are seen back in the office next week  The subscapularis was repaired so the patient will begin internal/external rotation exercises starting at 4 weeks      Implant Name Type Inv.  Item Serial No.  Lot No. LRB No. Used Action   COMP MIGUEL UNI REV POR [id number] - SN/A  COMP MIGUEL UNI REV POR OD35MM N/A [employer]  [id number] Right 1 Implanted   SPHERE MIGUEL [id number] +4 BASEPLT 24MM SHLDR LAT - SN/A  SPHERE MIGUEL [id number] +4 BASEPLT 24MM SHLDR LAT N/A [employer]  [id number] Right 1 Implanted   SCREW BN 36MM 4.5MM EULA STRL - SN/A  SCREW BN 36MM 4.5MM EULA STRL N/A [employer]  [id number] Right 1 Implanted   SCREW BN 40MM 4.5MM EULA STRL - SN/A  SCREW BN 40MM 4.5MM EULA STRL N/A [employer]  [id number] Right 1 Implanted   SCREW BN 16MM 5.5MM LOCK EULA - SN/A  SCREW BN 16MM 5.5MM [address]  [id number] Right 1 Implanted   SCREW BN 20MM 5.5MM LOCK EULA - SN/A  SCREW BN 20MM 5.5MM LOCK EULA N/A [employer] [facility] [id number] Right 1 Implanted   CUP HUM [id number] +2MM OFFSET R SHLDR SUT - SN/A  CUP HUM [id number] +2MM OFFSET R SHLDR SUT N/A [employer]  [id number] Right 1 Implanted   LINER HUM [id number] +6MM OFFSET MED SHLDR - SN/A  LINER HUM [id number] +6MM OFFSET MED [facility] [id number] Right 1 Implanted   STEM HUMRL UNIVRS 12 SHLDR - SN/A  STEM HUMRL UNIVRS 12 [facility] [id number] Right 1 Implanted   24MM BASEPLATE, 20 FULL AUG, +2 LAT, ST - S. 24MM BASEPLATE, 20 FULL AUG, +2 LAT, ST .  [address] [id number] Right 1 Implanted        Clayton Alves MD 10/03/23

## 2024-02-01 NOTE — TELEPHONE ENCOUNTER
Patient requests orders be placed to be done today prior to his examination to see me in February 7.  Orders were put in system for patient to have done.

## 2024-02-07 NOTE — PATIENT INSTRUCTIONS
Patient appears to be in atrial fibrillation today.  Patient has not been taking his Multaq twice a day as as it has been prescribed by his cardiologist, Dr. Loy Mccormack.  At this point I would have patient take his Multaq twice a day as it has been prescribed.  I will plan to see the patient back in 1 week to check the patient's pulse and cardiac rhythm.  Patient is to continue his current diet, medications and activity.  Patient has declined to receive the COVID vaccines and his flu vaccine.  Patient is up-to-date on his Pneumovax vaccines and also his tetanus vaccine.  I will place an order in system for the patient have a repeat urinalysis on a nonfasting basis to check on his microscopic hematuria.   patient is to continue to watch his sweets and his carbs.

## 2024-02-07 NOTE — PROGRESS NOTES
Kevyn Murrieta is a 83 year old male.  Chief Complaint   Patient presents with    Checkup     5 month    Arthritis    Hyperlipidemia    Ashd     HPI:     Chief Complaint   Patient presents with    Checkup     5 month    Arthritis    Hyperlipidemia    Ashd     Pt feels well.  Pt felt different this AM and is concerned that he may be in Atrial Fibrillation.  Pt has been missing his morning Multaq \"about half the time\".  Pt felt some  \"flutter\" in his chest this AM.  Pt has not had any Covid vaccines or Flu vaccines.  Pt has decided that he does not want to take them.  No c/o chest pain or SOB.  Pt's right shoulder surgery, \"reverse shoulder surgery\", has turned out well.  Current Outpatient Medications   Medication Sig Dispense Refill    NON FORMULARY nightly. Nitrate Oxide      LORazepam 0.5 MG Oral Tab TAKE 1 TABLET EVERY NIGHT 90 tablet 1    MULTAQ 400 MG Oral Tab Take 1 tablet (400 mg total) by mouth 2 (two) times daily with meals.      rivaroxaban 20 MG Oral Tab Take 1 tablet (20 mg total) by mouth daily.      Acetaminophen (TYLENOL OR) Take 500 mg by mouth. As needed      finasteride 5 MG Oral Tab Take 1 tablet (5 mg total) by mouth daily. (Patient taking differently: Take 1 tablet (5 mg total) by mouth nightly.) 90 tablet 3    tamsulosin 0.4 MG Oral Cap Take 1 capsule (0.4 mg total) by mouth After Breakfast. TAKE 30 MINUTES AFTER MEAL 90 capsule 3    Azelastine HCl 0.15 % Nasal Solution SPRAY ONE SPRAY IN EACH NOSTRIL TWICE DAILY IN EACH NOSTRIL AS NEEDED 30 mL 3    Zinc 50 MG Oral Tab TAKE ONE TABLET BY MOUTH ONCE DAILY      Evolocumab 140 MG/ML Subcutaneous Solution Auto-injector Per patient, injections every 2 weeks.       Omega-3 Fatty Acids (OMEGA-3 EPA FISH OIL OR) 1500MG, DHA 750MG orally daily      melatonin 3 MG Oral Tab 5-15 mg at bedtime      aspirin 81 MG Oral Tab Take 1 tablet (81 mg total) by mouth nightly.      ibuprofen 200 MG Oral Tab Take 1 tablet (200 mg total) by mouth daily as  needed.        Past Medical History:   Diagnosis Date    Abnormal cardiovascular function 2001    elevated calcium score     Allergic rhinitis 2000    Allergy     Arrhythmia     A-Fib    Arthritis 2015    mild    ASHD (arteriosclerotic heart disease)     Atherosclerosis of coronary artery 2008    Back injury 2007    BPH (benign prostatic hyperplasia)     Colon polyp     Colon, diverticulosis     High cholesterol     Osteoarthritis     Spinal stenosis 2005    Type 2 diabetes mellitus without complication, without long-term current use of insulin ([facility]) 2/7/2024      Social History:  Social History     Socioeconomic History    Marital status:    Tobacco Use    Smoking status: Never    Smokeless tobacco: Never   Vaping Use    Vaping Use: Never used   Substance and Sexual Activity    Alcohol use: Yes     Alcohol/week: 1.0 standard drink of alcohol     Types: 1 Glasses of wine per week     Comment: 0-2 drinks weekly    Drug use: Never   Other Topics Concern    Caffeine Concern Yes     Comment: Coffee 2-3 cups daily    Exercise Yes     Comment: 5x per week, average over 60809 steps per day   Social History Narrative    The patient does not use an assistive device..      The patient does live in a home with stairs.     Social Determinants of Health     Food Insecurity: No Food Insecurity (10/3/2023)    Food Insecurity     Food Insecurity: Never true   Transportation Needs: No Transportation Needs (10/3/2023)    Transportation Needs     Lack of Transportation: No   Housing Stability: Low Risk  (10/3/2023)    Housing Stability     Housing Instability: No        REVIEW OF SYSTEMS:   GENERAL HEALTH: feels well otherwise  RESPIRATORY:No cough or SOB  CARDIOVASCULAR: No chest pain  GI: No abdominal pain, nausea, vomiting, diarrhea, or constipation  :No Urinary complaints  EXT:No complaints of pain or swelling in patient's legs    EXAM:   /60 (BP Location: Right arm, Patient Position: Sitting, Cuff Size: large)    Pulse 100   Temp 97.8 °F (36.6 °C)   Ht 5' 11\" (1.803 m)   Wt 200 lb (90.7 kg)   SpO2 97%   BMI 27.89 kg/m²   GENERAL: well developed, well nourished in no acute distress  HEENT: normal oropharynx, normal TM's. Ears are normal. Eyes are normal  NECK: supple,no lymphadenopathy or masses, no bruits  CHEST:   Well-developed male.  LUNGS: clear to auscultation  CARDIO: RRR, normal S1S2, without murmur .  Patient's pulse is about 100 bpm and the rhythm is irregularly irregular.  GI:Protuberant, BS are present, no organomegaly or palpable masses  EXTREMITIES: no edema  NEURO: alert and oriented  ASSESSMENT AND PLAN:   1. ASHD (arteriosclerotic heart disease)  Patient appears to be doing well.  Patient had no complaints of chest pain or shortness of breath.  Patient was found to be in atrial fibrillation today.  Patient is encouraged to take his Multaq twice a day as prescribed.  Patient has been only taking it once a day \"about half the time\".  Patient will take the Multaq twice a day.  He will continue his other medications and diet.  I will see the patient back in 1 week to check his cardiac rhythm and have a repeat EKG.    2. Primary osteoarthritis involving multiple joints  Doing well.  CPM.  Patient had recent right shoulder surgery which is turned out well.  Patient has good range of motion about his right shoulder.    3. Paroxysmal atrial fibrillation (HCC)  Patient felt that he had some fluttering in his chest today.  Patient's pulse was initially irregular irregular at a rate of about 100/min.  Patient had an EKG which showed patient to be in atrial fibrillation with a rate about 80 bpm.  As above patient has been lax in taking his Multaq twice a day.  I have encouraged the patient to resume taking his Multaq twice a day.  Patient is currently protected because he is taking his Xarelto daily.    4. History of shoulder surgery  Patient had a good result with his reverse shoulder replacement surgery.  Patient  is very happy with his result.    5. Hypercholesterolemia  Patient's recent lipid panel had a cholesterol 1 and 38, triglycerides were 62, HDL cholesterol was 54 and LDL cholesterol was 71.  Patient's AST was 25 and ALT was 22.  Patient is doing well.  CPM.  I will see the patient back next week as noted above.    6. Atherosclerosis of aorta (HCC)  Stable.  CPM.    7. Type 2 diabetes mellitus without complication, without long-term current use of insulin (HCC)  Doing well.  CPM.  Patient's recent FBS was 107.  Hemoglobin A1c is 6.1.  Patient is stable at this time.    8. Diverticulosis of colon  Stable.  CPM.    9. Spinal stenosis of lumbar region without neurogenic claudication  Stable.  CPM.    10. Benign non-nodular prostatic hyperplasia without lower urinary tract symptoms  Stable.  CPM.    11. History of adenomatous polyp of colon  Stable.  CPM.    12. Elevated CPK  Stable.  CPM.    13. Peripheral polyneuropathy  Stable.  CPM.    14. Fatigue, unspecified type  Doing well.  CPM.    15.  Microscopic hematuria.  Patient's urinalysis has shown the patient microscopic hematuria.  I will obtain a repeat nonfasting urinalysis sometime in the next few days.  I will see the patient back next week as noted above.      The patient indicates understanding of these issues and agrees to the plan.  The patient is asked to return in 1 week with a repeat EKG..    Goldy Benz MD  2/7/2024  8:52 AM

## 2024-02-14 NOTE — PATIENT INSTRUCTIONS
Patient to continue his current diet, medication and activity.  Plan to see the patient back in about 3 or 4 months with blood tests as ordered which will include a CBC, BMP, hemoglobin A1c, lipid panel, AST, ALT and CPK.  I will see the patient back sooner as necessary.

## 2024-02-14 NOTE — PROGRESS NOTES
Kevyn Murrieta is a 83 year old male.  Chief Complaint   Patient presents with    Checkup     F/u A Fib    Atrial Fibrillation     HPI:     Chief Complaint   Patient presents with    Checkup     F/u A Fib    Atrial Fibrillation     Pt feels better.  Pt is now taking his Multaq twice a day.  Pt has been good about taking his Multaq regularly. Pt's EKG  shows pt to be back in NSR.  Patient feels well.  Current Outpatient Medications   Medication Sig Dispense Refill    NON FORMULARY nightly. Nitrate Oxide      LORazepam 0.5 MG Oral Tab TAKE 1 TABLET EVERY NIGHT 90 tablet 1    MULTAQ 400 MG Oral Tab Take 1 tablet (400 mg total) by mouth 2 (two) times daily with meals.      rivaroxaban 20 MG Oral Tab Take 1 tablet (20 mg total) by mouth daily.      Acetaminophen (TYLENOL OR) Take 500 mg by mouth. As needed      finasteride 5 MG Oral Tab Take 1 tablet (5 mg total) by mouth daily. (Patient taking differently: Take 1 tablet (5 mg total) by mouth nightly.) 90 tablet 3    tamsulosin 0.4 MG Oral Cap Take 1 capsule (0.4 mg total) by mouth After Breakfast. TAKE 30 MINUTES AFTER MEAL 90 capsule 3    Azelastine HCl 0.15 % Nasal Solution SPRAY ONE SPRAY IN EACH NOSTRIL TWICE DAILY IN EACH NOSTRIL AS NEEDED 30 mL 3    Zinc 50 MG Oral Tab TAKE ONE TABLET BY MOUTH ONCE DAILY      Evolocumab 140 MG/ML Subcutaneous Solution Auto-injector Per patient, injections every 2 weeks.       Omega-3 Fatty Acids (OMEGA-3 EPA FISH OIL OR) 1500MG, DHA 750MG orally daily      melatonin 3 MG Oral Tab 5-15 mg at bedtime      aspirin 81 MG Oral Tab Take 1 tablet (81 mg total) by mouth nightly.      ibuprofen 200 MG Oral Tab Take 1 tablet (200 mg total) by mouth daily as needed.        Past Medical History:   Diagnosis Date    Abnormal cardiovascular function 2001    elevated calcium score     Allergic rhinitis 2000    Allergy     Arrhythmia     A-Fib    Arthritis 2015    mild    ASHD (arteriosclerotic heart disease)     Atherosclerosis of  coronary artery 2008    Back injury 2007    BPH (benign prostatic hyperplasia)     Colon polyp     Colon, diverticulosis     High cholesterol     Osteoarthritis     Spinal stenosis 2005    Type 2 diabetes mellitus without complication, without long-term current use of insulin ([facility]) 2/7/2024      Social History:  Social History     Socioeconomic History    Marital status:    Tobacco Use    Smoking status: Never    Smokeless tobacco: Never   Vaping Use    Vaping Use: Never used   Substance and Sexual Activity    Alcohol use: Yes     Alcohol/week: 1.0 standard drink of alcohol     Types: 1 Glasses of wine per week     Comment: 0-2 drinks weekly    Drug use: Never   Other Topics Concern    Caffeine Concern Yes     Comment: Coffee 2-3 cups daily    Exercise Yes     Comment: 5x per week, average over 50974 steps per day   Social History Narrative    The patient does not use an assistive device..      The patient does live in a home with stairs.     Social Determinants of Health     Food Insecurity: No Food Insecurity (10/3/2023)    Food Insecurity     Food Insecurity: Never true   Transportation Needs: No Transportation Needs (10/3/2023)    Transportation Needs     Lack of Transportation: No   Housing Stability: Low Risk  (10/3/2023)    Housing Stability     Housing Instability: No        REVIEW OF SYSTEMS:   GENERAL HEALTH: feels well otherwise  RESPIRATORY:No cough or SOB  CARDIOVASCULAR: No chest pain  GI: No abdominal pain, nausea, vomiting, diarrhea, or constipation  :No Urinary complaints  EXT:No complaints of pain or swelling in patient's legs    EXAM:   /60 (BP Location: Right arm, Patient Position: Sitting, Cuff Size: large)   Pulse 60   Temp 97.5 °F (36.4 °C) (Oral)   Ht 5' 11\" (1.803 m)   Wt 202 lb (91.6 kg)   BMI 28.17 kg/m²   GENERAL: well developed, well nourished in no acute distress  HEENT: normal oropharynx.  Ears are normal. Eyes are normal  NECK: supple,no lymphadenopathy or masses,  no bruits  CHEST: Well-developed male.  LUNGS: clear to auscultation  CARDIO: RRR, normal S1S2, without murmur   GI:Protuberant, BS are present, no organomegaly or palpable masses  EXTREMITIES: no edema  NEURO: alert and oriented    EKG shows the patient to be in normal sinus rhythm with first-degree AV block which is an improvement from the patient's atrial fibrillation last week.  ASSESSMENT AND PLAN:   1. Paroxysmal atrial fibrillation (HCC)  Patient is EKG from today shows the patient to be back in normal sinus rhythm with a first-degree AV block.  Patient is now taking his Multaq twice a day,\" most of the time\".  I will plan to see the patient back in 3 to 4 months with blood test which will include a CBC, BMP, hemoglobin A1c, lipid panel, AST, ALT and CPK.  I will see the patient back sooner as necessary.    - EKG In-Office [[id number]]    2. ASHD (arteriosclerotic heart disease)  Doing well.  CPM.    3. Atherosclerosis of aorta (HCC)  Stable.  CPM.    4. Primary osteoarthritis involving multiple joints  Stable.  CPM.      The patient indicates understanding of these issues and agrees to the plan.  The patient is asked to return in 3 to 4 months with blood tests as noted above..    Goldy Benz MD  2/14/2024  4:38 PM

## 2024-02-20 NOTE — TELEPHONE ENCOUNTER
To MD:  The above refill request is for a controlled substance.  Please review pended medication order.   Print and sign for staff to fax to pharmacy or prescribe electronically.    Last office visit: 2/14/24    Last time refill sent and quantity/refills:    Per [facility] Med Data      Dispensed Written Strength Quantity Refills Days Supply Provider Pharmacy    LORAZEPAM 01/02/2024 08/08/2023 0.5 mg 30  30 LANA QIUNONES MD [facility]..

## 2024-02-21 NOTE — TELEPHONE ENCOUNTER
I have refilled pt's Lorazepam prescription as requested.   No respiratory distress. No stridor, Lungs sounds clear with good aeration bilaterally.

## 2024-05-10 NOTE — TELEPHONE ENCOUNTER
Patient cancelled his appointment on June 17th     Hoping to see Dr Benz one more time before he retires    Patient advises he will be away June 12 - 17th    Please call to advise

## 2024-05-21 NOTE — PATIENT INSTRUCTIONS
Patient is to continue his current diet, medication and activity.  Patient to follow-up with Dr. Dowling, his cardiologist as he is doing.  Patient also follows up with Dr. Loy Mccormack, his electrophysiology cardiologist.  Patient is to return in about 5 months with blood test, urinalysis and EKG for his annual physical examination.  The blood test will include a CBC, CMP, hemoglobin A1c, lipid panel, TSH and urinalysis.  Patient will plan to see Dr. Payton at this time.

## 2024-05-21 NOTE — PROGRESS NOTES
Kevyn Murrieta is a 83 year old male.  Chief Complaint   Patient presents with    Checkup    Atrial Fibrillation    Ashd    Arthritis     HPI:     Chief Complaint   Patient presents with    Checkup    Atrial Fibrillation    Ashd    Arthritis     Patient feels well.  Patient feels that his legs occasionally feel weak.  Patient feels that his knees are weak and are occasionally going to give out on him.  Patient is atrial fibrillation is doing well.  He has not had any more episodes that he is aware of.  He has been taking his medication regularly.  Patient has no complaints of chest pain or shortness of breath.  Current Outpatient Medications   Medication Sig Dispense Refill    Ascorbic Acid (VITAMIN C) 1000 MG Oral Tab Take 1 tablet (1,000 mg total) by mouth daily.      LORazepam 0.5 MG Oral Tab TAKE 1 TABLET EVERY NIGHT 90 tablet 1    NON FORMULARY nightly. Nitrate Oxide      MULTAQ 400 MG Oral Tab Take 1 tablet (400 mg total) by mouth 2 (two) times daily with meals.      rivaroxaban 20 MG Oral Tab Take 1 tablet (20 mg total) by mouth daily.      Acetaminophen (TYLENOL OR) Take 500 mg by mouth. As needed      finasteride 5 MG Oral Tab Take 1 tablet (5 mg total) by mouth daily. (Patient taking differently: Take 1 tablet (5 mg total) by mouth nightly.) 90 tablet 3    tamsulosin 0.4 MG Oral Cap Take 1 capsule (0.4 mg total) by mouth After Breakfast. TAKE 30 MINUTES AFTER MEAL 90 capsule 3    Azelastine HCl 0.15 % Nasal Solution SPRAY ONE SPRAY IN EACH NOSTRIL TWICE DAILY IN EACH NOSTRIL AS NEEDED 30 mL 3    Zinc 50 MG Oral Tab TAKE ONE TABLET BY MOUTH ONCE DAILY      Evolocumab 140 MG/ML Subcutaneous Solution Auto-injector Per patient, injections every 2 weeks.       melatonin 3 MG Oral Tab 5-15 mg at bedtime      aspirin 81 MG Oral Tab Take 1 tablet (81 mg total) by mouth nightly.      ibuprofen 200 MG Oral Tab Take 1 tablet (200 mg total) by mouth daily as needed.        Past Medical History:    Abnormal  cardiovascular function    elevated calcium score     Allergic rhinitis    Allergy    Arrhythmia    A-Fib    Arthritis    mild    ASHD (arteriosclerotic heart disease)    Atherosclerosis of coronary artery    Back injury    BPH (benign prostatic hyperplasia)    Colon polyp    Colon, diverticulosis    High cholesterol    Osteoarthritis    Spinal stenosis    Type 2 diabetes mellitus without complication, without long-term current use of insulin (HCC)      Social History:  Social History     Socioeconomic History    Marital status:    Tobacco Use    Smoking status: Never    Smokeless tobacco: Never   Vaping Use    Vaping status: Never Used   Substance and Sexual Activity    Alcohol use: Yes     Alcohol/week: 1.0 standard drink of alcohol     Types: 1 Glasses of wine per week     Comment: 0-2 drinks weekly    Drug use: Never   Other Topics Concern    Caffeine Concern Yes     Comment: Coffee 2-3 cups daily    Exercise Yes     Comment: 5x per week, average over [id number] steps per day   Social History Narrative    The patient does not use an assistive device..      The patient does live in a home with stairs.     Social Determinants of Health     Food Insecurity: No Food Insecurity (10/3/2023)    Food Insecurity     Food Insecurity: Never true   Transportation Needs: No Transportation Needs (10/3/2023)    Transportation Needs     Lack of Transportation: No   Housing Stability: Low Risk  (10/3/2023)    Housing Stability     Housing Instability: No        REVIEW OF SYSTEMS:   GENERAL HEALTH: feels well otherwise  RESPIRATORY:No cough or SOB  CARDIOVASCULAR: No chest pain  GI: No abdominal pain, nausea, vomiting, diarrhea, or constipation  :No Urinary complaints  EXT:No complaints of pain or swelling in patient's legs    EXAM:   /64 (BP Location: Left arm, Patient Position: Sitting, Cuff Size: large)   Pulse 64   Temp 98.3 °F (36.8 °C)   Ht 5' 11\" (1.803 m)   Wt 203 lb (92.1 kg)   SpO2 97%   BMI 28.31 kg/m²    GENERAL: well developed, well nourished in no acute distress  HEENT: normal oropharynx, normal TM's. Ears are normal. Eyes are normal  NECK: supple,no lymphadenopathy or masses, no bruits  CHEST: Well-developed male.  LUNGS: clear to auscultation  CARDIO: RRR, normal S1S2, without murmur   GI:Protuberant, BS are present, no organomegaly or palpable masses  EXTREMITIES: no edema  NEURO: alert and oriented  ASSESSMENT AND PLAN:   1. Paroxysmal atrial fibrillation (HCC)  Patient is to continue his current diet, medication and activity.  Patient to follow-up with his electrophysiologic cardiologist, Dr. Loy Mccormack concerning his atrial fibrillation.  Patient is also to follow-up with Dr. Bonifacio Dowling, his cardiologist.  Patient is to return in about 5 months with blood test, urinalysis and EKG for his annual physical examination.  His blood test will include a CBC, CMP, hemoglobin A1c, lipid panel, TSH and urinalysis.  Patient be seen sooner as necessary.  Patient will follow-up with Dr. Payton in his next visit.    2. ASHD (arteriosclerotic heart disease)  Doing well.  CPM.  As above.    3. Atherosclerosis of aorta (HCC)  Stable.  CPM.    4. Primary osteoarthritis involving multiple joints  Stable.  CPM.    5. Fatigue, unspecified type  Doing well.  CPM.    6. Type 2 diabetes mellitus without complication, without long-term current use of insulin (HCC)  Stable.  CPM.  Patient's recent FBS was 95.  His hemoglobin A1c was 5.9.  Patient is doing well.  CPM.    7. Hypercholesterolemia  Stable.  CPM.  Patient's recent lipid panel had a cholesterol 145, triglycerides were 76, HDL cholesterol is 55 and LDL cholesterol 75.  Patient's AST was 26 and ALT was 36.  CPM.    8. History of shoulder surgery  Doing well.  CPM.    9. Diverticulosis of colon  Stable.  CPM.    10. Spinal stenosis of lumbar region without neurogenic claudication  Stable.  CPM.    11. Benign non-nodular prostatic hyperplasia without lower urinary tract  symptoms  Stable.  CPM.    12. History of adenomatous polyp of colon  Stable.  CPM.    13. Elevated CPK  Stable.  CPM.  Patient has had a long history of elevated CPK.    14. Peripheral polyneuropathy  Stable.  CPM.      The patient indicates understanding of these issues and agrees to the plan.  The patient is asked to return in 5 months with blood test, urinalysis and EKG for his annual physical examination as noted above.  Patient will see Dr. Payton at that time.    Goldy Benz MD  5/21/2024  9:48 AM

## 2024-08-09 NOTE — TELEPHONE ENCOUNTER
You are correct. Cannot do cervical injections and lumbar injections at the same time. Please let him know I said so, Thanks. Colitis  WHAT YOU NEED TO KNOW:  Colitis is swelling and irritation of your colon. Colitis may be caused by ulcers or a problem with your immune system. Bacteria, a virus, or a parasite may also cause colitis. The cause may not be known. You may have diarrhea, abdominal pain, fever, or blood or mucus in your bowel movement.  DISCHARGE INSTRUCTIONS:  Return to the emergency department if:   •You have sudden trouble breathing.  •Your bowel movements are black or have blood in them.  •You have blood in your vomit.  •You have severe abdominal pain or your abdomen is swollen and feels hard.  •You have any of the following signs of dehydration: ?Dizziness or weakness  ?Dry mouth, cracked lips, or severe thirst  ?Fast heartbeat or breathing  ?Urinating very little or not at all  Call your doctor if:   •Your symptoms get worse or do not go away.  •You have a fever, chills, cough, or feel weak and achy.  •You suddenly lose weight without trying.  •You have questions or concerns about your condition or care.  Medicines:   •Medicines may be given to decrease inflammation in your colon and treat diarrhea.  •Take your medicine as directed. Contact your healthcare provider if you think your medicine is not helping or if you have side effects. Tell him of her if you are allergic to any medicine. Keep a list of the medicines, vitamins, and herbs you take. Include the amounts, and when and why you take them. Bring the list or the pill bottles to follow-up visits. Carry your medicine list with you in case of an emergency.  Manage your symptoms:   •Drink liquids as directed to help prevent dehydration. Good liquids to drink include water, juice, and broth. Ask how much liquid to drink each day. You may need to drink an oral rehydration solution (ORS). An ORS contains a balance of water, salt, and sugar to replace body fluids lost during diarrhea.  •Eat a variety of healthy foods. Healthy foods include fruits, vegetables, whole-grain breads, beans, low-fat dairy products, lean meats, and fish. You may need to eat several small meals throughout the day instead of large meals. Avoid spicy foods, caffeine, chocolate, and foods high in fat.  •Talk to your healthcare provider before you take NSAIDs. NSAIDs can cause worsen your symptoms if ulcers are causing your colitis.  •Start to exercise when you feel better. Regular exercise helps your bowels work normally. Ask about the best exercise plan for you.  Prevent the spread of germs:   •Wash your hands often. Wash your hands several times each day. Wash after you use the bathroom, change a child's diaper, and before you prepare or eat food. Use soap and water every time. Rub your soapy hands together, lacing your fingers. Wash the front and back of your hands, and in between your fingers. Use the fingers of one hand to scrub under the fingernails of the other hand. Wash for at least 20 seconds. Rinse with warm, running water for several seconds. Then dry your hands with a clean towel or paper towel. Use hand  that contains alcohol if soap and water are not available. Do not touch your eyes, nose, or mouth without washing your hands first.  Handwashing   •Cover a sneeze or cough. Use a tissue that covers your mouth and nose. Throw the tissue away in a trash can right away. Use the bend of your arm if a tissue is not available. Wash your hands well with soap and water or use a hand .  •Clean surfaces often. Clean doorknobs, countertops, cell phones, and other surfaces that are touched often. Use a disinfecting wipe, a single-use sponge, or a cloth you can wash and reuse. Use disinfecting  if you do not have wipes. You can create a disinfecting  by mixing 1 part bleach with 10 parts water.  •Ask about vaccines you may need. Vaccines help prevent disease caused by some viruses and bacteria. Get the influenza (flu) vaccine as soon as recommended each year. The flu vaccine is usually available starting in September or October. Flu viruses change, so it is important to get a flu vaccine every year. Get the pneumonia vaccine if recommended. This vaccine is usually recommended every 5 years. Your provider will tell you when to get this vaccine, if needed. Your healthcare provider can tell you if you should get other vaccines, and when to get them.  Follow up with your doctor as directed: You may need to return for a colonoscopy or other tests. Write down how often you have a bowel movements and what they look like. Bring this to your follow-up visits. Write down your questions so you remember to ask them during your visits.  Colitis  LO QUE NECESITA SABER:  La colitis es la inflamación e irritación del colon. La colitis se puede producir por johana úlcera o un problema con hendricks sistema inmune. También lo puede causar johana bacteria, un virus o parásitos. Es probable que la causa no sea conocida. Usted puede tener diarrea, dolor abdominal, fiebre o pat o mucosidad en katharina deposiciones.  INSTRUCCIONES SOBRE EL [facility]ITAL[facility]:  Regrese a la fabian de emergencias si:  •Usted tiene dificultad repentina para respirar.  •Katharina evacuaciones intestinales son de color praful o contienen pat.  •Hendricks vómito tiene pat.  •Tiene dolor abdominal grave, o tiene el abdomen carrol e hinchado.  •Usted tiene alguno de los siguientes síntomas de deshidratación: ?Mareos o debilidad  ?Sequedad en la boca, labios partidos o sed intensa  ?Latidos cardíacos o respiración acelerada  ?Orinar poco o nada en lo absoluto  Llame a hendricks médico si:  •Katharina síntomas empeoran o no desaparecen.  •Usted tiene fiebre, escalofríos, tos o se siente débil y adolorido.  •Pierde peso sin proponérselo.  •Usted tiene preguntas o inquietudes acerca de hendricks condición o cuidado.  Medicamentos:  •Los medicamentospara disminuir la inflamación en hendricks colon y tratar la diarrea.  •Alverda katharina medicamentos patricia se le haya indicado.Consulte con hendricks médico si usted hiram que hendricks medicamento no le está ayudando o si presenta efectos secundarios. Infórmele si es alérgico a algún medicamento. Mantenga johana lista actualizada de los medicamentos, las vitaminas y los productos herbales que karlie. Incluya los siguientes datos de los medicamentos: cantidad, frecuencia y motivo de administración. Traiga con usted la lista o los envases de las píldoras a katharina citas de seguimiento. Lleve la lista de los medicamentos con usted en yuriy de johana emergencia.  El manejo de katharina síntomas:  •Alverda líquidos patricia se le indique para ayudar a evitar la deshidratación.Los mejores líquidos para shalini incluyen el agua, jugo y caldo. Pregunte cuánto líquido shalini cada día. Es posible que necesite shalini johana solución de rehidratación oral (SRO). La solución contiene la proporción adecuada de agua, sal y azúcar para reemplazar los líquidos corporales que se pierden sonia la diarrea.  •Consuma alimentos saludables y variados.Los alimentos saludables incluyen frutas, verduras, pan integral, productos lácteos con bajo contenido de grasa, neftaly magras y pescado. Es posible que deba ingerir varias porciones pequeñas sonia el día, en lugar de porciones grandes. Evite las comidas picantes, la cafeína, chocolate y alimentos altos en grasa.  •Consulte con hendricks médico antes de shalini PETRA.Los PETRA pueden empeorar los síntomas si las úlceras le causan colitis.  •Empiece a hacer ejercicio cuando se sienta mejor.El ejercicio regular ayuda a normalizar las evacuaciones. Pida más información acerca de un plan de ejercicio adecuado para usted.  Prevenga la propagación de gérmenes:  •Lávese las aakash frecuentemente.Lávese las aakash varias veces al día. Lávese después de usar el baño, después de cambiar pañales y antes de preparar la comida o comer. Use siempre agua y jabón. Frótese las aakash enjabonadas, entrelazando los dedos. Lávese el frente y el dorso de las aakash, y entre los dedos. Use los dedos de johana mano para restregar debajo de las uñas de la otra mano. Lávese sonia al menos 20 segundos. Enjuague con agua corriente caliente sonia varios segundos. Luego séquese las aakash con johana toalla limpia o johana toalla de papel. Puede usar un desinfectante para aakash que contenga alcohol, si no hay agua y jabón disponibles. No se toque los ojos, la nariz o la boca sin antes lavarse las aakash.  Lavado de aakash  •Cúbrase al toser o estornudar.Use un pañuelo que cubra la boca y la nariz. Arroje el pañuelo a la basura de inmediato. Use el ángulo del brazo si no tiene un pañuelo disponible. Lávese las aakash con agua y jabón o use un desinfectante de aakash.  •Limpie las superficies con frecuencia.Limpie las perillas de las kusum, los muebles de la cocina, teléfonos celulares y otras superficies que la gente toca con frecuencia. Use johana toallita desinfectante, johana esponja de un solo uso o un paño que pueda elan y reutilizar. Use limpiadores desinfectantes si no tiene toallitas. Usted también puede elaborar un limpiador desinfectante mezclando 1 parte de blanqueador con 10 partes de agua.  •Pregunte sobre las vacunas que pudiera necesitar.Las vacunas ayudan a prevenir las enfermedades causadas por algunos virus y bacterias. Vacúnese contra la influenza (gripe) tan pronto patricia se recomiende cada año. La vacuna contra la gripe suele estar disponible a partir de septiembre u octubre. Los virus de la gripe cambian, por lo que es importante vacunarse contra la gripe cada año. Vacúnese contra la neumonía si se recomienda. Esta vacuna generalmente se recomienda cada 5 años. Hendricks médico le indicará cuándo recibir esta vacuna, de ser necesaria. Hendricks médico puede indicarle si debe recibir otras vacunas, y cuándo aplicárselas.  Acuda a la consulta de control con hendricks médico según las indicaciones:Es posible que deba regresar para hacerse johana colonoscopia u otras pruebas. Escriba cuántas veces tuvo johana evacuación intestinal y qué aspecto tenían. Traiga esto a katharina citas de seguimiento. Anote katharina preguntas para que se acuerde de hacerlas sonia katharina visitas.

## 2024-09-03 NOTE — TELEPHONE ENCOUNTER
To MD:  The above refill request is for a controlled substance.  Please review pended medication order.   Print and sign for staff to fax to pharmacy or prescribe electronically.    Last office visit: 5/21/24 ()  Last time refill sent and quantity/refills: 2/20/24 #90 with 1 refill ()    Upcoming appt. with  on 11/7/24

## 2024-09-23 NOTE — TELEPHONE ENCOUNTER
Patient calling for refill Tamsulosin. Urologist that prescribed medication has retired, patient ask Dr. Payton to refill.  Patient has appointment 11/7/2024 with Dr. Payton.    [facility]    Best call back number [phone]

## 2024-09-25 NOTE — TELEPHONE ENCOUNTER
To Dr Payton--please see message below from patient and advise on tamsulosin refill request  Will be establishing with you on 11/7/24  Thank you!

## 2024-11-06 NOTE — PATIENT INSTRUCTIONS
- You were seen in clinic for regular annual check-up.  We reviewed your most recent set of blood work with overall good control of cholesterol, sugar levels    Lets continue with chiropractic adjustments and manipulations of the left arm.  Continue with massage therapy and hopefully this can improve over time as this seems to be a soft tissue injury.    Lets continue with targeting weight loss over time good nutrition and exercise  - Lets continue managing with pain of arthritis of the back, the knees  - You may use Tylenol on as-needed basis  - Avoid the use of NSAIDs such as Advil/ibuprofen, Aleve/naproxen as this can have mild bleeding effects with your blood thinner medication  - Continue with home stretches and exercises  - Follow-up with Dr. Brennan of orthopedic surgery    -Cardiac status seems to remain stable, continue following with both Dr. Mccormack and Dr. Dowling  -Colonoscopy is no longer indicated  - Follow-up with urology for management of prostate symptoms.  -Please continue checking your blood pressures at home and notify us if they are increasing   - please continue checking your blood sugars at home and notify us if they are increasing  - Please continue following up with ophthalmology for usual eye exams, Dr. Meehan  - Vaccines may be due for: COVID-vaccine series, We recommended checking with your insurance for coverage of Shingrix, a 2-dose shingles vaccine that can be obtained at the pharmacy if there is adequate coverage through your insurance plan.    - Please continue to eat a varied diet including recommended servings of vegetables, fruits, and low fat dairy. Minimize high saturated fats (such as fast foods) and high sugar intake (such as soda)  - We recommend 150 minutes of moderate intensity exercise (brisk walking, swimming) weekly to maintain your current weight.  Targeted weight loss will require more vigorous exercise or more than 150 minutes/week.    Return to clinic in 6 months for  follow-up

## 2024-11-06 NOTE — H&P
HPI:   Kevyn Murrieta is a 83 year old male who presents for a Medicare Subsequent Annual Wellness visit (Pt already had Initial Annual Wellness)    Doing well. No pain. Some discomfort with after exercising and lifting weights.     Will continue with chiropractic adjustments and deep tissue massage. At night, he does have unevenness, some decreased sensitivity/numbness/sharp pains under the toes.    Some advil from time to time. Huber emu does seem to help. Corticosteroid injections of both knees x with Dr. Brennan.    Dr. Esteban Mendosa was his neurosurgeon.     6-7hours; 1-2 episodes of nocturia. Has been seeing [facility] [place] - Dr. Olegario Jain.    I reviewed and updated the PMHx, FamHx, medications, allergies, and SocHx as below with the patient    SocHX  - Home: Feels safe at home  - Work: Retired   - Hobbies: previously boating; exercise from time to time, home landscaping.  - Nutrition: Eating much better - Mediterranean diet. Water - pretty good. Smart water.  - Physical Activity Not enough, not as consistent. Needs to do more stretches. 5400 steps on average,      No topic due editable text        Fall Risk Assessment: He has been screened for Falls and is low risk.    Cognitive Assessment:He had a completely normal cognitive assessment - see flowsheet entries     Functional Ability/Status: Kevyn Murrieta has a completely normal functional assessment. See flowsheet for details.        Depression Screening (PHQ-2/PHQ-9): Over the LAST 2 WEEKS   Little interest or pleasure in doing things (over the last two weeks)?: Not at all  Feeling down, depressed, or hopeless (over the last two weeks)?: Not at all  PHQ-2 SCORE: 0  Little interest or pleasure in doing things: Not at all  Feeling down, depressed, or hopeless: Not at all  PHQ-2 SCORE: 0      Advanced Directive:  He does NOT have a Living Will. [Do you have a living will?: Yes]    Tobacco:  He has never smoked tobacco.  Mr. Murrieta already takes  aspirin and has it on his medication list.   CAGE Alcohol Screen: CAGE screening score of 0 on 11/7/2024, showing low risk of alcohol abuse.       Patient Care Team: Patient Care Team:  Michael Payton MD as PCP - General (Internal Medicine)  Bonifacio Dowling MD (CARDIOLOGY)  Danilo Forte MD as Consulting Physician (NEUROLOGY)    Patient Active Problem List   Diagnosis    CAD (coronary artery disease)    Hypercholesterolemia    Osteoarthritis    Abnormal fasting glucose    Spinal stenosis    Peripheral neuropathy (HCC)    Fatigue    Elevated PSA    Diverticulosis of colon    History of adenomatous polyp of colon    Elevated CPK    Benign non-nodular prostatic hyperplasia without lower urinary tract symptoms    Atherosclerosis of aorta (HCC)    Peyronie disease    Sensorineural hearing loss, bilateral    History of PTCA    Primary osteoarthritis involving multiple joints    Lumbar spondylosis    Postlaminectomy syndrome of lumbar region    Migraine aura without headache    S/P reverse total shoulder arthroplasty, right    Rotator cuff arthropathy of right shoulder    Paroxysmal atrial fibrillation (HCC)    History of shoulder surgery    Type 2 diabetes mellitus without complication, without long-term current use of insulin (HCC)     Wt Readings from Last 3 Encounters:   11/07/24 199 lb (90.3 kg)   05/21/24 203 lb (92.1 kg)   02/14/24 202 lb (91.6 kg)      Last Cholesterol Labs:   Lab Results   Component Value Date    CHOLEST 165 11/06/2024    HDL 62 (H) 11/06/2024    LDL 91 11/06/2024    TRIG 59 11/06/2024          Last Chemistry Labs:   Lab Results   Component Value Date    AST 25 11/06/2024    ALT 21 11/06/2024    CA 9.7 11/06/2024    ALB 4.3 11/06/2024    TSH 1.154 11/06/2024    CREATSERUM 1.11 11/06/2024    GLU 94 11/06/2024        CBC  (most recent labs)   Lab Results   Component Value Date    WBC 7.3 11/06/2024    HGB 12.9 (L) 11/06/2024    .0 11/06/2024        ALLERGIES:   He is allergic to cat hair  extract, dust mites, mold spores, and pollen.    CURRENT MEDICATIONS:   Outpatient Medications Marked as Taking for the 11/7/24 encounter (Office Visit) with Michael Payton MD   Medication Sig    tamsulosin 0.4 MG Oral Cap Take 1 capsule (0.4 mg total) by mouth After Breakfast. TAKE 30 MINUTES AFTER MEAL    LORazepam 0.5 MG Oral Tab TAKE 1 TABLET EVERY NIGHT    Ascorbic Acid (VITAMIN C) 1000 MG Oral Tab Take 1 tablet (1,000 mg total) by mouth daily.    NON FORMULARY nightly. Nitrate Oxide    MULTAQ 400 MG Oral Tab Take 1 tablet (400 mg total) by mouth 2 (two) times daily with meals.    rivaroxaban 20 MG Oral Tab Take 1 tablet (20 mg total) by mouth daily.    Acetaminophen (TYLENOL OR) Take 500 mg by mouth. As needed    finasteride 5 MG Oral Tab Take 1 tablet (5 mg total) by mouth daily. (Patient taking differently: Take 1 tablet (5 mg total) by mouth nightly.)    Azelastine HCl 0.15 % Nasal Solution SPRAY ONE SPRAY IN EACH NOSTRIL TWICE DAILY IN EACH NOSTRIL AS NEEDED    Zinc 50 MG Oral Tab TAKE ONE TABLET BY MOUTH ONCE DAILY    Evolocumab 140 MG/ML Subcutaneous Solution Auto-injector Per patient, injections every 2 weeks.     melatonin 3 MG Oral Tab 5-15 mg at bedtime    aspirin 81 MG Oral Tab Take 1 tablet (81 mg total) by mouth nightly.    ibuprofen 200 MG Oral Tab Take 1 tablet (200 mg total) by mouth daily as needed.      MEDICAL INFORMATION:   He  has a past medical history of Abnormal cardiovascular function (2001), Allergic rhinitis (2000), Allergy, Arrhythmia, Arthritis (2015), ASHD (arteriosclerotic heart disease), Atherosclerosis of coronary artery (2008), Back injury (2007), BPH (benign prostatic hyperplasia), Colon polyp, Colon, diverticulosis, High cholesterol, Osteoarthritis, Spinal stenosis (2005), and Type 2 diabetes mellitus without complication, without long-term current use of insulin ([facility]) (2/7/2024).    He  has a past surgical history that includes electrocardiogram, complete (11/13/2013);  angioplasty (coronary) (2008); other surgical history (2007); colonoscopy (6/12, 9/18); colonoscopy (N/A, 09/21/2018); back surgery (2007); cataract (April & Aug 2021); and tonsillectomy (1948).    His family history includes Cancer in his brother and mother; Diabetes in his brother; Obesity in his brother; Stroke in his father.   SOCIAL HISTORY:   He  reports that he has never smoked. He has never used smokeless tobacco. He reports current alcohol use of about 1.0 standard drink of alcohol per week. He reports that he does not use drugs.     REVIEW OF SYSTEMS:   GENERAL: feels well otherwise  SKIN: denies any unusual skin lesions  EYES: denies blurred vision or double vision  HEENT: denies nasal congestion, sinus pain or ST  LUNGS: denies shortness of breath with exertion  CARDIOVASCULAR: denies chest pain on exertion  GI: denies abdominal pain, denies heartburn  : 1-2x per night nocturia, no complaint of urinary incontinence  MUSCULOSKELETAL: denies back pain; +L elbow discomfort  NEURO: denies headaches  PSYCHE: denies depression or anxiety  HEMATOLOGIC: denies hx of anemia  ENDOCRINE: denies thyroid history  ALL/ASTHMA: denies hx of allergy or asthma    EXAM:   /70   Pulse 64   Temp 97 °F (36.1 °C)   Ht 5' 11\" (1.803 m)   Wt 199 lb (90.3 kg)   SpO2 98%   BMI 27.75 kg/m²   Estimated body mass index is 27.75 kg/m² as calculated from the following:    Height as of this encounter: 5' 11\" (1.803 m).    Weight as of this encounter: 199 lb (90.3 kg).    Medicare Hearing Assessment  (Required for AWV/SWV)    Hearing Screening    Screening Method: Whisper Test  Whisper Test Result: Pass                  Visual Acuity  Right Eye Visual Acuity: Corrected Right Eye Chart Acuity: 20/20   Left Eye Visual Acuity: Corrected Left Eye Chart Acuity: 20/20   Both Eyes Visual Acuity: Corrected Both Eyes Chart Acuity: 20/20   Able To Tolerate Visual Acuity: Yes      General Appearance:  Alert, cooperative, no distress,  appears stated age   Head:  Normocephalic, without obvious abnormality, atraumatic   Eyes:  PERRL, conjunctiva/corneas clear, EOM's intact, both eyes   Ears:  Normal TM's and external ear canals, both ears   Nose: Nares normal, septum midline, mucosa normal, no drainage or sinus tenderness   Throat: Lips, mucosa, and tongue normal; teeth and gums normal   Neck: Supple, symmetrical, trachea midline, no adenopathy, thyroid: not enlarged, symmetric, no tenderness/mass/nodules, no carotid bruit or JVD   Back:   Symmetric, no curvature, ROM normal, no CVA tenderness   Lungs:   Clear to auscultation bilaterally, respirations unlabored   Chest Wall:  No tenderness or deformity   Heart:  Regular rate and rhythm, S1, S2 normal, no murmur, rub or gallop   Abdomen:   Soft, non-tender, bowel sounds active all four quadrants,  no masses, no organomegaly   Genitalia: Deferred   Rectal: Deferred   Extremities: Extremities normal, atraumatic, no cyanosis or edema   Pulses: 2+ and symmetric   Skin: Skin color, texture, turgor normal, no rashes or lesions   Lymph nodes: Cervical, supraclavicular, nodes normal   Neurologic: Normal, CN II through XII intact, 5 out of 5 muscle strength throughout     Diabetic foot examination  - No visible ulcers, wounds  - Nails with some interval of nails removed  - Vibration sense b/l mild deficiency - decreased sensation in forefoot b/l  - Monofilament x 4 areas bilateral and symmetrical-plantar surface, dorsal surface, lateral foot; +deficient in b/l big toes         Vaccination History     Immunization History   Administered Date(s) Administered    FLU VAC High Dose 65 YRS & Older PRSV Free (44375) 10/23/2020    Influenza 01/16/2019    Pneumococcal (Prevnar 13) 12/30/2015    Pneumovax 23 06/21/2017    TDAP 06/29/2016    Zoster Vaccine Live (Zostavax) 03/19/2008        ASSESSMENT AND OTHER RELEVANT CHRONIC CONDITIONS:   Kevyn Murrieta is a 83 year old male who presents for a Medicare  Assessment.     X-ray bilateral knees 10/1/2024  4 views Bilatearl knee(s) with [place] and Lateral with weightbearing AP,   PA.     Medial compartment with  joint space narrowing subchondral sclerosis.   Lateral compartment with  joint space narrowing subchondral sclerosis.   There are  periarticular osteophytes. Patellofemoral compartment with    joint space narrowing  retropatellar osteophytes.     IMPRESSION:    Degenerative changes bilateral knee       PLAN SUMMARY:   Diagnoses and all orders for this visit:    Annual physical exam  -     Comp Metabolic Panel (14); Future    Paroxysmal atrial fibrillation (HCC)    ASHD (arteriosclerotic heart disease)    Atherosclerosis of aorta (HCC)    Benign prostatic hyperplasia with urinary frequency  -     PSA - Diagnostic [E]; Future    Primary osteoarthritis involving multiple joints    Type 2 diabetes mellitus without complication, without long-term current use of insulin (HCC)  -     Hemoglobin A1C; Future    Hypercholesterolemia  -     Lipid Panel; Future    Elevated CPK  -     CK (Creatine Kinase) (Not Creatinine) (E); Future    Benign non-nodular prostatic hyperplasia without lower urinary tract symptoms    Spinal stenosis of lumbar region without neurogenic claudication    Peripheral polyneuropathy    Atrial fibrillation, unspecified type (HCC)    Microscopic hematuria    Primary osteoarthritis of right shoulder    Vitamin D deficiency  -     Vitamin D; Future    Vitamin B12 deficiency  -     Vitamin B12; Future    Screening for nephropathy  -     Microalb/Creat Ratio, Random Urine; Future    Elevated PSA  -     PSA - Diagnostic [E]; Future    Screening for thyroid disorder  -     TSH W Reflex To Free T4; Future    Screening for deficiency anemia  -     CBC With Differential With Platelet; Future    Encounter for annual health examination           ASHD (arteriosclerotic heart disease)  - Notable multivessel PCA 2009 for coronary artery disease.    - Has had a  normal stress echo last year  - On aspirin 81 mg daily last seen by Dr. Dowling 10/21/2020 remains asymptomatic.  Plans for follow-up in echo stress test in 1 year    Atrial fibrillation  -When evaluated by Dr. Dowling, there was concern for palpitations.  Did see Dr. Price 11/7/2022 and diagnosed with A. fib for the first time.  Heart rates can be as high as 150  - Given history of coronary artery disease, started on Multaq 400 mg twice a day  - On Xarelto 20 mg once a day  - Most recently seen by Dr. Mccormack 4/17/2024: Has had some paroxysms of A-fib, MCT monitor 1 episode most recently, 9 9% sinus rhythm.  If symptomatic, would consider medications.  Otherwise continued on Multaq 400 mg twice a day     Hypercholesterolemia  -Cholesterol levels have improved from 5/2022  - HDL 62, triglycerides 77, LDL 91  - Does follow with Dr. Dowling of cardiology  - On omega-3 fatty acids, Repatha injection twice a month     DM2  Recent A1c:   HgbA1C (%)   Date Value   11/06/2024 6.1 (H)     Recent urine microalbumin: No components found for: \"MICROALB/CREAT RATIO\"  Current medications: Diet controlled  Eye exam: Follows with Dr. Meehan  Foot exam: Mild neuropathy as above  - Remains well-controlled       Primary osteoarthritis involving multiple joints  Stable.  CPM.  Patient now complains of some pain in his right shoulder.  He has been told that he is bone-on-bone his right shoulder.  - Status post right reverse total shoulder arthroplasty 10/3/2023 with Dr. Macias, has recovered well from the surgery.  -Seen by Dr. Duncan Brennan 10/1/2024, status post arthrocentesis and knee injections.  Has follow-up for gel injections in the future.  -I suspect his elbow discomfort is related to possible tennis elbow, epicondylitis.  He is seeing a chiropractor for some deep tissue massage     Atherosclerosis of aorta (HCC)  -Comanagement of hyperlipidemia as above     Spinal stenosis of lumbar region without neurogenic claudication  -Seems to  be stable     Benign non-nodular prostatic hyperplasia without lower urinary tract symptoms  - Patient has been following up with Dr. Richter regarding his BPH.  Last seen 5/2022  - Continue with finasteride 5 mg daily, tamsulosin 0.4 mg once a day     History of adenomatous polyp of colon  -Last colonoscopy with Dr. Brennan 2018, no longer indicated for screening purposes     Elevated CPK  -CK is chronically elevated for which patient is asymptomatic  - CK has down trended to 490 most recently.  - On coenzyme Q 10     Peripheral polyneuropathy  Seems to be stable. Bottom of the feet - middle of the left toe  - Stretches and exercises  - Will have him see Dr. Ayala for re-evluation     Elevated PSA  -Established with urology, Dr. Richter last seen 5/31/2022  - PSA level elevated 4.45, did see Dr. Melvin at [place] in the past    Visual Floaters  - Seen by Dr. Meehan, concern for possible ophthalmic migraines    HTN Screen: BP at goal  DM Screen: As above  HLD Screen: As above  HCV Screen: Considered low risk  HIV Screen: considered low risk  G/C/Syphilis: Considered low risk    Colon Cancer Screening (45-70): Last colonoscopy 9/2018, no longer needed per Dr. Brennan  Prostate Cancer Screening: (50-70): PSA elevated 4.45, established with urology as above.  He is interested in finding a provider that offers the Rezum procedure  Lung Cancer Screening (55-79 with 30 p/year and active < 15 years): Not indicated  AAA Screening (65-75 Hx of smoking): Not indicated    Influenza: Annually   Td/Tdap: Last Tdap 2016, due 2026  Zoster (50+): Recommended 2 doses Shringrix  HPV (19-26): Not indicated  Pneumococcal: Up-to-date    Immunization History   Administered Date(s) Administered    FLU VAC High Dose 65 YRS & Older PRSV Free (36759) 10/23/2020    Influenza 01/16/2019    Pneumococcal (Prevnar 13) 12/30/2015    Pneumovax 23 06/21/2017    TDAP 06/29/2016    Zoster Vaccine Live (Zostavax) 03/19/2008         Diet assessment:  good     PLAN:  The patient indicates understanding of these issues and agrees to the plan.  Reinforced healthy diet, lifestyle, and exercise.    No follow-ups on file.     Michael Payton MD, 11/20/2022     General Health     In the past six months, have you lost more than 10 pounds without trying?: 2 - No  Has your appetite been poor?: No  How does the patient maintain a good energy level?: Appropriate Exercise  How would you describe your daily physical activity?: Light  How would you describe your current health state?: Good  How do you maintain positive mental well-being?: Visiting Friends;Visiting Family     Supplementary Documentation:   Kevyn Murrieta's SCREENING SCHEDULE   Tests on this list are recommended by your physician but may not be covered, or covered at this frequency, by your insurer.   Please check with your insurance carrier before scheduling to verify coverage.   PREVENTATIVE SERVICES FREQUENCY &  COVERAGE DETAILS LAST COMPLETION DATE   Diabetes Screening    Fasting Blood Sugar / Glucose    One screening every 12 months if never tested or if previously tested but not diagnosed with pre-diabetes   One screening every 6 months if diagnosed with pre-diabetes Lab Results   Component Value Date    GLU 94 11/06/2024        Cardiovascular Disease Screening    Lipid Panel  Cholesterol  Lipoprotein (HDL)  Triglycerides Covered every 5 years for all Medicare beneficiaries without apparent signs or symptoms of cardiovascular disease Lab Results   Component Value Date    CHOLEST 165 11/06/2024    HDL 62 (H) 11/06/2024    LDL 91 11/06/2024    TRIG 59 11/06/2024         Electrocardiogram (EKG)   Covered if needed at Welcome to Medicare, and non-screening if indicated for medical reasons 02/15/2024      Ultrasound Screening for Abdominal Aortic Aneurysm (AAA) Covered once in a lifetime for one of the following risk factors    Men who are 65-75 years old and have ever smoked    Anyone with a family  history -     Colorectal Cancer Screening  Covered for ages 50-85; only need ONE of the following:    Colonoscopy   Covered every 10 years    Covered every 2 years if patient is at high risk or previous colonoscopy was abnormal 09/21/2018    No recommendations at this time    Flexible Sigmoidoscopy   Covered every 4 years -    Fecal Occult Blood Test Covered annually -   Prostate Cancer Screening    Prostate-Specific Antigen (PSA) Annually Lab Results   Component Value Date    PSA 4.45 (H) 11/10/2022     Health Maintenance   Topic Date Due    PSA  07/20/2024      Immunizations    Influenza Covered once per flu season  Please get every year -  Influenza Vaccine(1) due on 10/01/2024    Pneumococcal Each vaccine ([id number] & [id number]) covered once after 65 Prevnar 13: 12/30/2015    [id number]: 06/21/2017     No recommendations at this time    Hepatitis B One screening covered for patients with certain risk factors   -  No recommendations at this time    Tetanus Toxoid Not covered by Medicare Part B unless medically necessary (cut with metal); may be covered with your pharmacy prescription benefits -    Tetanus, Diptheria and Pertusis TD and TDaP Not covered by Medicare Part B -  No recommendations at this time    Zoster Not covered by Medicare Part B; may be covered with your pharmacy  prescription benefits 03/19/2008  Zoster Vaccines(2 of 3) due on 05/14/2008     Diabetes      Hemoglobin A1C Annually; if last result is elevated, may be asked to retest more frequently.    Medicare covers every 3 months Lab Results   Component Value Date     (H) 11/06/2024    A1C 6.1 (H) 11/06/2024       No recommendations at this time    Creat/alb ratio Annually No results found for: \"MICROALBCREA\", \"MALBCRECALC\"    LDL Annually Lab Results   Component Value Date    LDL 91 11/06/2024       Dilated Eye Exam Annually Last Diabetic Eye Exam:  No data recorded  No data recorded

## 2025-02-12 NOTE — TELEPHONE ENCOUNTER
To MD:  The above refill request is for a controlled substance.  Please review pended medication order.   Print and sign for staff to fax to pharmacy or prescribe electronically.    Last office visit:   11/7/24  Last time refill sent and quantity/refills:   9/4/24   #90/1

## 2025-04-24 NOTE — TELEPHONE ENCOUNTER
Refill request is for a maintenance medication and has met the criteria specified in the Ambulatory Medication Refill Standing Order for eligibility, visits, laboratory, alerts and was sent to the requested pharmacy.    Requested Prescriptions     Signed Prescriptions Disp Refills    Tadalafil 20 MG Oral Tab 20 tablet 3     Sig: Take 1 tablet (20 mg total) by mouth daily as needed for Erectile Dysfunction.     Authorizing Provider: CYNTHIA WALDRON     Ordering User: HARRISON BROOKS

## 2025-04-28 NOTE — TELEPHONE ENCOUNTER
Per patient's wife, Cate Lino patient has been having bright red blood all day since this morning 10:00 am, since he cleaned his left ear, had no pain or injury but tissue paper placed in canal has had small amount of bright red blood throughout the day. Reminded to not put anything including Q-tip in ear,no poking or prodding. Informed we are closing at 5:00 pm, therefore should go to ER or Immediate care if concerned for a evaluation which is going to do tonight, requesting an appt for tomorrow with Dr. Diaz and appt made.  Bleeding has stopped over last 10 minutes but ear canal now blocked.  Future Appointments   Date Time Provider Department Center   4/29/2025  2:15 PM Jesus Diaz DO [facility]

## 2025-04-28 NOTE — TELEPHONE ENCOUNTER
Per patient he cleaned his ear this morning there was no pain or injury, but his ear has been bleeding since 10:00 AM. Please advise

## 2025-04-29 NOTE — PROGRESS NOTES
[facility]  OTOLARYNGOLOGY - HEAD & NECK SURGERY    4/29/2025     Reason for Consultation:   Left ear bleeding, nasal congestion, tinnitus    History of Present Illness:   Patient is a pleasant 84 year old male who is being seen for 3 different ENT related issues.  Primarily he is here because he had some bleeding from his left ear after using a Q-tip.  He was evaluated in the urgent care and was prescribed Ciprodex.  He states that he also had some old blood removed from his ear at the urgent care.  He was unable to obtain the Ciprodex eardrops due to the pharmacy being out of stock.  He is here to see me today to follow-up on this as well as to address some other ENT issues.  Additionally has had significant nasal congestion.  States that he has a history of allergies.  Has done nasal antihistamine spray in the past and has been using nasal rinses without any significant alleviation.  Feels that he is constantly stuffy.  Has not had any previous nasal surgery.  Additionally has been having tinnitus bilaterally.  He describes this as a noise that he hears bilaterally some which is static.  He states he had his hearing tested many years ago and was told that he has some mild hearing loss. he is hoping that there is something that can be done for this.    Past Medical History  Past Medical History[1]    Past Surgical History  Past Surgical History[2]    Family History  Family History[3]    Social History  Pediatric History   Patient Parents    Not on file     Other Topics Concern     Service Not Asked    Blood Transfusions Not Asked    Caffeine Concern Yes     Comment: Coffee 2-3 cups daily    Occupational Exposure Not Asked    Hobby Hazards Not Asked    Sleep Concern Not Asked    Stress Concern Not Asked    Weight Concern Not Asked    Special Diet Not Asked    Back Care Not Asked    Exercise Yes     Comment: 5x per week, average over 20497 steps per day    Bike Helmet Not Asked    Seat Belt Not Asked     Self-Exams Not Asked   Social History Narrative    The patient does not use an assistive device..      The patient does live in a home with stairs.           Current Medications:  Current Medications[4]    Allergies  Allergies[5]    Review of Systems:   A comprehensive 10 point review of systems was completed.  Pertinent positives and negatives noted in the the HPI.    Physical Exam:   There were no vitals taken for this visit.    GENERAL: No acute distress, Comfortable appearing  FACE: HB 1/6, Normal Animation  HEAD: Normocephalic  EYES: EOMI, pupils equil  EARS: Bilateral Auricles Symmetric  NOSE: Nares patent bilaterally  ORAL CAVITY: Tongue mobile, Oropharynx clear, Floor of mouth clear, Posterior oropharynx normal  NECK: No palpable lymphadenopathy, thyroid not palpable, nontender    Canals:  Right: Clear  Left: There is a small excoriation inferiorly which appears to be healing well, there are some minimal dried blood which was removed using an alligator forceps    Tympanic Membranes:  Right: Normal tympanic membrane, with no retraction, middle ear space clear  Left: Normal tympanic membrane. with no retraction middle ear space clear    TM Visualized Method:   Right TM examined via otomicroscopy.   Left TM examined via otomicroscopy.      PROCEDURE: BILATERAL RIGID NASAL ENDOSCOPY  Bilateral rigid nasal endoscopy (45480) was performed. Verbal consent was obtained from the patient to proceed with rigid nasal endoscopy.  A rigid 4mm 30 degree nasal endoscope was used to examine both nasal cavities. The inferior meatus, inferior turbinate, nasopharynx, middle meatus, middle turbinate, superior meatus, superior turbinate, and sphenoethmoidal recess were examined bilaterally and deemed to be normal, with any exceptions as noted below. At the completion of the procedure the endoscope was removed. The patient tolerated the procedure well. There were no complications.    Findings: The bilateral inferior turbinates  were pale and edematous bilaterally. The Septum was deviated bilaterally at the crest. The middle meatus was patent bilaterally without any purulent drainage. There were no obvious masses or polyps noted.    Results:     Laboratory Data:  Lab Results   Component Value Date    WBC 7.3 11/06/2024    HGB 12.9 (L) 11/06/2024    HCT 37.9 (L) 11/06/2024    .0 11/06/2024    CREATSERUM 1.11 11/06/2024    BUN 20 11/06/2024     11/06/2024    K 4.5 11/06/2024     (H) 11/06/2024    CO2 26.0 11/06/2024    GLU 94 11/06/2024    CA 9.7 11/06/2024    ALB 4.3 11/06/2024    ALKPHO 56 11/06/2024    TP 6.9 11/06/2024    AST 25 11/06/2024    ALT 21 11/06/2024    TSH 1.154 11/06/2024    PSA 4.45 (H) 11/10/2022    DDIMER 1.06 (H) 07/16/2021    ESRML 13 01/09/2019    TROP <0.045 07/16/2021     (H) 11/06/2024    B12 405 07/20/2023         Imaging:  No results found.      Impression:       ICD-10-CM    1. Allergic rhinitis due to pollen, unspecified seasonality  J30.1       2. Deviated nasal septum  J34.2       3. Hypertrophy of nasal turbinates  J34.3       4. Nasal congestion  R09.81       5. Otorrhagia of left ear  H92.22       6. Tinnitus of both ears  H93.13            Recommendations:  I would like to trial the patient on budesonide rinses and he will continue his Astepro.  Additionally I will try him on magnesium glycinate for his tinnitus.  He will avoid Q-tip use in his ears.  He will return to see me in 6 weeks for reevaluation.    Thank you for allowing me to participate in the care of your patient.    Jesus Diaz,    Otolaryngology/Rhinology, Sinus, and Endoscopic Skull Base Surgery  [address] [place] [address]  Phone [phone]  Fax [phone]  4/29/2025  5:27 PM  4/29/2025          [1]   Past Medical History:   Abnormal cardiovascular function    elevated calcium score     Allergic rhinitis    Allergy    Arrhythmia    A-Fib    Arthritis     mild    ASHD (arteriosclerotic heart disease)    Atherosclerosis of coronary artery    Back injury    BPH (benign prostatic hyperplasia)    Colon polyp    Colon, diverticulosis    High cholesterol    Osteoarthritis    Spinal stenosis    Type 2 diabetes mellitus without complication, without long-term current use of insulin (HCC)   [2]   Past Surgical History:  Procedure Laterality Date    Angioplasty (coronary)      PTCA + stent x 3    Back surgery      L-4&5&S-1    Cataract  April & Aug 2021    Colonoscopy  ,     Colonoscopy N/A 2018    Procedure: COLONOSCOPY, POSSIBLE BIOPSY, POSSIBLE POLYPECTOMY 79868;  Surgeon: Jamil Brennan MD;  [facility] [facility]    Electrocardiogram, complete  2013    Scanned to media tab - DOS 2013    Other surgical history      Laminoforaminotomy    Tonsillectomy  194   [3]   Family History  Problem Relation Age of Onset    Stroke Father          2000-age 94    Cancer Mother         brain at age 83    Obesity Brother     Cancer Brother         CLL-11 years so far    Diabetes Brother         obese   [4]   Current Outpatient Medications   Medication Sig Dispense Refill    budesonide 0.5 MG/2ML Inhalation Suspension Add to neilmed sinus rinse bottle along with distilled water to the line. Use half the bottle to irrigate one nasal cavity, and the other half to irrigate the other nasal cavity. Do this twice a day 120 mL 3    azelastine 0.1 % Nasal Solution 2 sprays by Nasal route 2 (two) times daily. 30 mL 3    Magnesium Glycinate 100 MG Oral Cap Take 200 mg by mouth at bedtime. 60 capsule 2    Tadalafil 20 MG Oral Tab Take 1 tablet (20 mg total) by mouth daily as needed for Erectile Dysfunction. 20 tablet 3    LORazepam 0.5 MG Oral Tab Take 1 tablet (0.5 mg total) by mouth nightly. 30 tablet 5    tamsulosin 0.4 MG Oral Cap Take 1 capsule (0.4 mg total) by mouth After Breakfast. TAKE 30 MINUTES AFTER MEAL 90 capsule 3    Ascorbic  Acid (VITAMIN C) 1000 MG Oral Tab Take 1 tablet (1,000 mg total) by mouth daily.      NON FORMULARY nightly. Nitrate Oxide      MULTAQ 400 MG Oral Tab Take 1 tablet (400 mg total) by mouth 2 (two) times daily with meals.      rivaroxaban 20 MG Oral Tab Take 1 tablet (20 mg total) by mouth daily.      Acetaminophen (TYLENOL OR) Take 500 mg by mouth. As needed      finasteride 5 MG Oral Tab Take 1 tablet (5 mg total) by mouth daily. (Patient taking differently: Take 1 tablet (5 mg total) by mouth nightly.) 90 tablet 3    Azelastine HCl 0.15 % Nasal Solution SPRAY ONE SPRAY IN EACH NOSTRIL TWICE DAILY IN EACH NOSTRIL AS NEEDED 30 mL 3    Zinc 50 MG Oral Tab TAKE ONE TABLET BY MOUTH ONCE DAILY      Evolocumab 140 MG/ML Subcutaneous Solution Auto-injector Per patient, injections every 2 weeks.       melatonin 3 MG Oral Tab 5-15 mg at bedtime      aspirin 81 MG Oral Tab Take 1 tablet (81 mg total) by mouth nightly.      ibuprofen 200 MG Oral Tab Take 1 tablet (200 mg total) by mouth daily as needed.     [5]   Allergies  Allergen Reactions    Cat Hair Extract Runny nose    Dust Mites Runny nose    Mold Spores Runny nose    Pollen Runny nose

## 2025-05-28 NOTE — TELEPHONE ENCOUNTER
To Dr. Pfeiffer     Please advise - patient calling, stated he provided urine sample at his chiropractors office yesterday and was told that his urine was very cloudy and should reach out to the office. No other urinary symptoms, states he saw a small drop of blood in underwear yesterday but has not noticed nay blood when urinating. Otherwise, denies frequency, urgency, pain/burning with urination, fevers/chills.   UA/Culture ordered per protocol. Will provide sample today

## 2025-05-28 NOTE — TELEPHONE ENCOUNTER
Patient is calling and thinks he may have a UTI.  Patient  States he was at his chiropractors office and they did a urine test.  Chiropractor advised the patient that his urine was very cloudy and he should  have a urinalysis done.    Please call and advise

## 2025-05-29 NOTE — TELEPHONE ENCOUNTER
Called patient and relayed Dr. Norton's message. Patient states Dr. Jain has retired. He would like a referral for a new urologist and he prefers a male physician.     To Dr. Payton--

## 2025-05-29 NOTE — TELEPHONE ENCOUNTER
UA negative for UTI.    Trace blood but no RBCs in urine. Pt should follow up with his urologist if visible blood in urine - based on previous PCP note it appears he was seeing Dr. Olegario Jain.    Thank you!

## 2025-05-30 NOTE — TELEPHONE ENCOUNTER
Please notify the patient no immediate need to see neurology.  Should maintain good water intake and this could help flush out the urine, lightening its color.  If still no better or having worsening symptoms, can see Dr. Perry:    Referred to Provider Information:  Provider Address Phone   Warren Perry DO [address] [address] [phone]

## 2025-05-30 NOTE — TELEPHONE ENCOUNTER
To  - called patient and relayed  message - info for urologist sent via [employer]    You mentioned Neurologist - sure you meant Urologist

## 2025-07-01 NOTE — PROGRESS NOTES
The following individual(s) verbally consented to be recorded using ambient AI listening technology and understand that they can each withdraw their consent to this listening technology at any point by asking the clinician to turn off or pause the recording: Patient consents    Patient name: Kevynanna Martinezvanita

## 2025-07-01 NOTE — PROGRESS NOTES
[place]  OTOLARYNGOLOGY - HEAD & NECK SURGERY    7/1/2025     Reason for Consultation:   Left ear bleeding, nasal congestion, tinnitus    History of Present Illness:   Patient is a pleasant 84 year old male who is being seen for 3 different ENT related issues.  Primarily he is here because he had some bleeding from his left ear after using a Q-tip.  He was evaluated in the urgent care and was prescribed Ciprodex.  He states that he also had some old blood removed from his ear at the urgent care.  He was unable to obtain the Ciprodex eardrops due to the pharmacy being out of stock.  He is here to see me today to follow-up on this as well as to address some other ENT issues.  Additionally has had significant nasal congestion.  States that he has a history of allergies.  Has done nasal antihistamine spray in the past and has been using nasal rinses without any significant alleviation.  Feels that he is constantly stuffy.  Has not had any previous nasal surgery.  Additionally has been having tinnitus bilaterally.  He describes this as a noise that he hears bilaterally some which is static.  He states he had his hearing tested many years ago and was told that he has some mild hearing loss. he is hoping that there is something that can be done for this.    INTERVAL HISTORY  7/1/2025:      Esteban Murrieta is an 84 year old male who presents with nasal congestion and ear issues.    He has a history of nasal congestion and sinus issues, particularly when exposed to allergens such as cat dander. He uses a nasal spray, Asti Pro, which he finds effective in relieving symptoms when he feels 'really stuffy' or experiences sneezing fits. Initially, he used the spray daily but has since reduced usage to an as-needed basis, typically using half a vial mixed with saline solution.    He previously experienced a bleeding ear after using Q-tips, which led to a visit to urgent care. The bleeding occurred after he scratched his ear  canal with a Q-tip. He mentions occasional ear ringing, which he attributes to his use of baby aspirin.    He reports a fall on June 6th while fishing, resulting in a fluid-filled swelling on his elbow. The swelling has persisted since the fall, and he inquires about the possibility of drainage to alleviate the condition. He describes the swelling as tender when pressure is applied.        Past Medical History  Past Medical History[1]    Past Surgical History  Past Surgical History[2]    Family History  Family History[3]    Social History  Pediatric History   Patient Parents    Not on file     Other Topics Concern     Service Not Asked    Blood Transfusions Not Asked    Caffeine Concern Yes     Comment: Coffee 2-3 cups daily    Occupational Exposure Not Asked    Hobby Hazards Not Asked    Sleep Concern Not Asked    Stress Concern Not Asked    Weight Concern Not Asked    Special Diet Not Asked    Back Care Not Asked    Exercise Yes     Comment: 5x per week, average over 45990 steps per day    Bike Helmet Not Asked    Seat Belt Not Asked    Self-Exams Not Asked   Social History Narrative    The patient does not use an assistive device..      The patient does live in a home with stairs.           Current Medications:  Current Medications[4]    Allergies  Allergies[5]    Review of Systems:   A comprehensive 10 point review of systems was completed.  Pertinent positives and negatives noted in the the HPI.    Physical Exam:   There were no vitals taken for this visit.    GENERAL: No acute distress, Comfortable appearing  FACE: HB 1/6, Normal Animation  HEAD: Normocephalic  EYES: EOMI, pupils equil  EARS: Bilateral Auricles Symmetric  NOSE: Nares patent bilaterally  ORAL CAVITY: Tongue mobile, Oropharynx clear, Floor of mouth clear, Posterior oropharynx normal  NECK: No palpable lymphadenopathy, thyroid not palpable, nontender    Canals:  Right: Clear  Left: Clear, excoriation appears to be well-healed    Tympanic  Membranes:  Right: Normal tympanic membrane, with no retraction, middle ear space clear  Left: Normal tympanic membrane. with no retraction middle ear space clear    TM Visualized Method:   Right TM examined via otomicroscopy.   Left TM examined via otomicroscopy.      PROCEDURE: BILATERAL RIGID NASAL ENDOSCOPY -as performed on 7/1/2025  Bilateral rigid nasal endoscopy (21123) was performed. Verbal consent was obtained from the patient to proceed with rigid nasal endoscopy.  A rigid 4mm 30 degree nasal endoscope was used to examine both nasal cavities. The inferior meatus, inferior turbinate, nasopharynx, middle meatus, middle turbinate, superior meatus, superior turbinate, and sphenoethmoidal recess were examined bilaterally and deemed to be normal, with any exceptions as noted below. At the completion of the procedure the endoscope was removed. The patient tolerated the procedure well. There were no complications.    Findings: The bilateral inferior turbinates were less pale and edematous bilaterally today. The Septum was deviated bilaterally at the crest. The middle meatus was patent bilaterally without any purulent drainage. There were no obvious masses or polyps noted.    Results:     Laboratory Data:  Lab Results   Component Value Date    WBC 7.3 11/06/2024    HGB 12.9 (L) 11/06/2024    HCT 37.9 (L) 11/06/2024    .0 11/06/2024    CREATSERUM 1.11 11/06/2024    BUN 20 11/06/2024     11/06/2024    K 4.5 11/06/2024     (H) 11/06/2024    CO2 26.0 11/06/2024    GLU 94 11/06/2024    CA 9.7 11/06/2024    ALB 4.3 11/06/2024    ALKPHO 56 11/06/2024    TP 6.9 11/06/2024    AST 25 11/06/2024    ALT 21 11/06/2024    TSH 1.154 11/06/2024    PSA 4.45 (H) 11/10/2022    DDIMER 1.06 (H) 07/16/2021    ESRML 13 01/09/2019    TROP <0.045 07/16/2021     (H) 11/06/2024    B12 405 07/20/2023         Imaging:  No results found.      Impression:       ICD-10-CM    1. Allergic rhinitis due to pollen, unspecified  seasonality  J30.1       2. Deviated nasal septum  J34.2       3. Hypertrophy of nasal turbinates  J34.3       4. Nasal congestion  R09.81       5. Otorrhagia of left ear  H92.22       6. Tinnitus of both ears  H93.13            Recommendations:  The patient appears to be doing well on budesonide and Astepro.  He will continue these as needed.  He will return to see me as needed.    Thank you for allowing me to participate in the care of your patient.    Jesus Diaz,    Otolaryngology/Rhinology, Sinus, and Endoscopic Skull Base Surgery  [address] [place] [address]  Phone [phone]  Fax [phone]  2025  5:27 PM  2025          [1]   Past Medical History:   Abnormal cardiovascular function    elevated calcium score     Allergic rhinitis    Allergy    Arrhythmia    A-Fib    Arthritis    mild    ASHD (arteriosclerotic heart disease)    Atherosclerosis of coronary artery    Back injury    BPH (benign prostatic hyperplasia)    Colon polyp    Colon, diverticulosis    Heart disease    3 stents    High cholesterol    Neurologic disorder    slight numbness on bottom of feet    Osteoarthritis    Spinal stenosis    Type 2 diabetes mellitus without complication, without long-term current use of insulin ([facility])   [2]   Past Surgical History:  Procedure Laterality Date    Angioplasty (coronary)      PTCA + stent x 3    Back surgery  2007    L-4&5&S-1    Cataract  April & Aug 2021    Colonoscopy  ,     Colonoscopy N/A 2018    Procedure: COLONOSCOPY, POSSIBLE BIOPSY, POSSIBLE POLYPECTOMY 72252;  Surgeon: Jamil Brennan MD;  [place] [facility]    Electrocardiogram, complete  2013    Scanned to media tab - DOS 2013    Other surgical history      Laminoforaminotomy    Tonsillectomy  194   [3]   Family History  Problem Relation Age of Onset    Stroke Father          2000-age 94    Heart Disorder Father          bypass heart at 88    Cancer Mother         brain at age 83    Obesity Brother     Cancer Brother         CLL-11 years so far    Diabetes Brother         obese    Diabetes Brother         very overweight for years    Cancer Brother     Diabetes Brother     Obesity Brother    [4]   Current Outpatient Medications   Medication Sig Dispense Refill    budesonide 0.5 MG/2ML Inhalation Suspension Add to neilmed sinus rinse bottle along with distilled water to the line. Use half the bottle to irrigate one nasal cavity, and the other half to irrigate the other nasal cavity. Do this twice a day 120 mL 3    azelastine 0.1 % Nasal Solution 2 sprays by Nasal route 2 (two) times daily. 30 mL 3    Magnesium Glycinate 100 MG Oral Cap Take 200 mg by mouth at bedtime. 60 capsule 2    Tadalafil 20 MG Oral Tab Take 1 tablet (20 mg total) by mouth daily as needed for Erectile Dysfunction. 20 tablet 3    LORazepam 0.5 MG Oral Tab Take 1 tablet (0.5 mg total) by mouth nightly. 30 tablet 5    tamsulosin 0.4 MG Oral Cap Take 1 capsule (0.4 mg total) by mouth After Breakfast. TAKE 30 MINUTES AFTER MEAL 90 capsule 3    Ascorbic Acid (VITAMIN C) 1000 MG Oral Tab Take 1 tablet (1,000 mg total) by mouth daily.      NON FORMULARY nightly. Nitrate Oxide      MULTAQ 400 MG Oral Tab Take 1 tablet (400 mg total) by mouth 2 (two) times daily with meals.      rivaroxaban 20 MG Oral Tab Take 1 tablet (20 mg total) by mouth daily.      Acetaminophen (TYLENOL OR) Take 500 mg by mouth. As needed      finasteride 5 MG Oral Tab Take 1 tablet (5 mg total) by mouth daily. (Patient taking differently: Take 1 tablet (5 mg total) by mouth nightly.) 90 tablet 3    Azelastine HCl 0.15 % Nasal Solution SPRAY ONE SPRAY IN EACH NOSTRIL TWICE DAILY IN EACH NOSTRIL AS NEEDED 30 mL 3    Zinc 50 MG Oral Tab TAKE ONE TABLET BY MOUTH ONCE DAILY      Evolocumab 140 MG/ML Subcutaneous Solution Auto-injector Per patient, injections every 2 weeks.       melatonin 3 MG Oral  Tab 5-15 mg at bedtime      aspirin 81 MG Oral Tab Take 1 tablet (81 mg total) by mouth nightly.      ibuprofen 200 MG Oral Tab Take 1 tablet (200 mg total) by mouth daily as needed.     [5]   Allergies  Allergen Reactions    Cat Hair Extract Runny nose    Dust Mites Runny nose    Mold Spores Runny nose    Pollen Runny nose

## 2025-07-28 NOTE — PROGRESS NOTES
Clinic Note     Allergies[1]    CC: right middle finger triggering, left elbow swelling    History of Present Illness  Esteban Murrieta is an 84 year old male who presents with left elbow pain and swelling following a fall.    He fell on his elbow on June 6th while in [place]. Initially, he sought help from an orthopedic specialist for a knee injection, hoping he would also address his elbow, but he did not. A friend, not in the medical field, attempted to drain the elbow on July 1st, but the swelling returned within two days. The elbow is painful only when performing planks or placing it on a flat surface. The patient reports swelling of the elbow, which is painful when pressure is applied.    He has a history of bursitis in both knees, which resolved without significant intervention. He has not had any recent treatment for the elbow bursitis aside from the attempted drainage by his friend. He is concerned about the persistent swelling and the impact on his activities, particularly exercises involving pressure on the elbow.    He reports issues with his right middle finger following a chiropractic session where his fingers were manipulated. He experiences difficulty closing his hand fully, especially in the mornings, and notes a clicking sensation in the right middle finger. There is pain in the hand, particularly when attempting to close it fully. He suspects the manipulation may have exacerbated the issue.    He has a history of arthritis, which may contribute to stiffness in his fingers. He has not received any recent treatment for the hand issue.      Past Medical History[2]  Past Surgical History[3]  Current Medications[4]  Family History[5]  Social History     Occupational History    Not on file   Tobacco Use    Smoking status: Never    Smokeless tobacco: Never   Vaping Use    Vaping status: Never Used   Substance and Sexual Activity    Alcohol use: Yes     Alcohol/week: 2.0 standard drinks of alcohol      Types: 1 Glasses of wine, 1 Shots of liquor per week     Comment: 0-2 drinks weekly    Drug use: Never    Sexual activity: Not on file        Review of Systems:  Negative unless stated in HPI.    Physical Exam:        Bilateral Upper Extremity:   Inspection: Skin Intact. No skin lesions. Left elbow swelling over olecranon bursa without erythema or signs of infection. TTP in this area.   Palpation: TTP over A1 pulley of right middle finger   Motion: Elbow: normal bilateral symmetric ext/flex  Wrist: normal bilateral symmetric ext/flex/sup/pro  Finger: normal in all digits   Special Tests: + active triggering/subtle catch of affected middle finger finger. + 10deg right middle finger IPJ contracture. Normally sensate digit. Normally perfused digit.     Diagnostics:  Left elbow xrays taken and reviewed by me today show mild to moderate osteoarthritis, no acute bony abnormalities.  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    Assessment/Plan:  84 year old male with right middle finger triggering, left elbow olecranon bursitis..    I reviewed the patient's electronic medical record, including the pertinent office notes, medical/surgical history, medications and images.   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 Draining is not recommended due to risk of infection and potential complications, including nonhealing wounds and the need for surgical intervention. The condition is expected to resolve over time with conservative management.  - Provide a tight ACE wrap for compression to reduce fluid production in the bursa.  - Advise avoiding placing the elbow on flat surfaces to prevent further irritation.  - Recommend using a heelbow pad for cushioning during activities that require elbow support.  - Discuss the possibility of surgical  intervention (bursal removal) if the condition persists for many months without improvement.      Triggering of right middle finger -  I discussed with the patient surgical and non-surgical treatment options and their success rates/risks. Using a shared decision-making process, patient elected to proceed with a corticosteroid injection.  CSI #1 was performed today for this digit.    Procedure:  The patient was indicated for a corticosteroid injection based on clinical exam, history and continued symptoms. The benefits, risks and alternatives as well as expected outcomes were explained to the patient. Specific considerations discussed included: pain at the injection site, incomplete relief or temporary relief of symptoms, infection, allergic reaction, skin discoloration, recurrence of pain requiring repeat injection or surgical intervention, tendon or ligament rupture or degeneration, and fat atrophy. Written consent was obtained.  Skin was prepped with ChloraPrep.  1 mL of 6 mg betamethasone and 1 mL of 1% lidocaine was injected into the subcutaneous tissue overlying the A1 pulley of right middle finger. Patient tolerated the procedure well.  No complications were encountered.  Band-Aid was applied.    Follow up in 2-3 months, no xrays needed.    Sb Bennett MD   Hand, Wrist, & Elbow Surgery  [email or web address]         [1]   Allergies  Allergen Reactions    Cat Hair Extract Runny nose    Dust Mites Runny nose    Mold Spores Runny nose    Pollen Runny nose   [2]   Past Medical History:   Abnormal cardiovascular function    elevated calcium score     Allergic rhinitis    Allergy    Arrhythmia    A-Fib    Arthritis    mild    ASHD (arteriosclerotic heart disease)    Atherosclerosis of coronary artery    Back injury    BPH (benign prostatic hyperplasia)    Colon polyp    Colon, diverticulosis    Heart disease    3 stents    High cholesterol    Neurologic disorder    slight numbness on bottom of feet     Osteoarthritis    Spinal stenosis    Type 2 diabetes mellitus without complication, without long-term current use of insulin (HCC)   [3]   Past Surgical History:  Procedure Laterality Date    Angioplasty (coronary)  2008    PTCA + stent x 3    Back surgery  2007    L-4&5&S-1    Cataract  April & Aug 2021    Colonoscopy  6/12, 9/18    Colonoscopy N/A 09/21/2018    Procedure: COLONOSCOPY, POSSIBLE BIOPSY, POSSIBLE POLYPECTOMY 85096;  Surgeon: Jamil Brennan MD;  [facility]    Electrocardiogram, complete  11/13/2013    Scanned to media tab - DOS 11-    Other surgical history  2007    Laminoforaminotomy    Tonsillectomy  1948   [4]   Current Outpatient Medications   Medication Sig Dispense Refill    budesonide 0.5 MG/2ML Inhalation Suspension Add to neilmed sinus rinse bottle along with distilled water to the line. Use half the bottle to irrigate one nasal cavity, and the other half to irrigate the other nasal cavity. Do this twice a day 120 mL 3    azelastine 0.1 % Nasal Solution 2 sprays by Nasal route 2 (two) times daily. 30 mL 3    Magnesium Glycinate 100 MG Oral Cap Take 200 mg by mouth at bedtime. 60 capsule 2    Tadalafil 20 MG Oral Tab Take 1 tablet (20 mg total) by mouth daily as needed for Erectile Dysfunction. 20 tablet 3    LORazepam 0.5 MG Oral Tab Take 1 tablet (0.5 mg total) by mouth nightly. 30 tablet 5    tamsulosin 0.4 MG Oral Cap Take 1 capsule (0.4 mg total) by mouth After Breakfast. TAKE 30 MINUTES AFTER MEAL 90 capsule 3    Ascorbic Acid (VITAMIN C) 1000 MG Oral Tab Take 1 tablet (1,000 mg total) by mouth daily.      NON FORMULARY nightly. Nitrate Oxide      MULTAQ 400 MG Oral Tab Take 1 tablet (400 mg total) by mouth 2 (two) times daily with meals.      rivaroxaban 20 MG Oral Tab Take 1 tablet (20 mg total) by mouth daily.      Acetaminophen (TYLENOL OR) Take 500 mg by mouth. As needed      finasteride 5 MG Oral Tab Take 1 tablet (5 mg total) by mouth daily.  (Patient taking differently: Take 1 tablet (5 mg total) by mouth nightly.) 90 tablet 3    Azelastine HCl 0.15 % Nasal Solution SPRAY ONE SPRAY IN EACH NOSTRIL TWICE DAILY IN EACH NOSTRIL AS NEEDED 30 mL 3    Zinc 50 MG Oral Tab TAKE ONE TABLET BY MOUTH ONCE DAILY      Evolocumab 140 MG/ML Subcutaneous Solution Auto-injector Per patient, injections every 2 weeks.       melatonin 3 MG Oral Tab 5-15 mg at bedtime      aspirin 81 MG Oral Tab Take 1 tablet (81 mg total) by mouth nightly.      ibuprofen 200 MG Oral Tab Take 1 tablet (200 mg total) by mouth daily as needed.     [5]   Family History  Problem Relation Age of Onset    Stroke Father          2000-age 94    Heart Disorder Father         bypass heart at 88    Cancer Mother         brain at age 83    Obesity Brother     Cancer Brother         CLL-11 years so far    Diabetes Brother         obese    Diabetes Brother         very overweight for years    Cancer Brother     Diabetes Brother     Obesity Brother

## (undated) DIAGNOSIS — Z20.822 CLOSE EXPOSURE TO COVID-19 VIRUS: Primary | ICD-10-CM

## (undated) NOTE — Clinical Note
Dear Mane Sanders,    I had the opportunity to see your patient Maryuri Sethi recently. I am sending you this update, and I appreciate your confidence in me to care for your patients.  Please feel free call me with any questions at [phone] or contact me through

## (undated) NOTE — LETTER
[address], [address], [address] [facility]  [address] [facility], [address]  [address]  [phone]        Dear Dr. London Villatoro,      I had the pleasure of seeing your patient, Yamini Garcia on 12/13/2018.      Below please find pt experiences then every 6 months. Pt was seen 10 years ago for neuropathy, was experiencing numbness in bottom of both feet. Current Outpatient Medications:  LORazepam 0.5 MG Oral Tab Take 0.5 mg by mouth nightly as needed for Anxiety.  Disp:  R loratadine 10 MG Oral Tab Take 10 mg by mouth daily as needed for Allergies. Disp:  Rfl:    Tadalafil (CIALIS) 20 MG Oral Tab Use as directed.   I recommend at least a 4 hour interval between taking Cialis and tamsulosin Disp: 36 tablet Rfl: 0   aspirin 81 Caffeine Concern: Yes          Coffee 2-3 cups daily        Exercise: Yes          5x per week, average over 03292 steps per day    Social History Narrative      The patient does not use an assistive device. .        The patient does live in a home wi Gait: Narrow based, negative Romberg’s sign, heel to shin intact. ASSESSMENT AND PLAN:   Although the history suggest visual migraine events, no prior history of migraines.   Neurological examination is normal.  Although I think the yield is low we will p WELCHOL 625 MG Oral Tab TAKE 3 TABLETS(1875 MG) BY MOUTH TWICE DAILY WITH MEALS Disp: 180 tablet Rfl: 3   finasteride 5 MG Oral Tab Take 1 tablet (5 mg total) by mouth daily.  Disp: 90 tablet Rfl: 3   Sildenafil Citrate 20 MG Oral Tab Take 2--3 tablets oral

## (undated) NOTE — MR AVS SNAPSHOT
[address] [place] [address] [phone]               Thank you for choosing us for your health care visit with Esther Vega MD.  We are glad to serve you and happy to provide you with this summary of your visit.   Pl Medical Issues Discussed Today     Abnormal fasting glucose    ASHD (arteriosclerotic heart disease)    Atherosclerosis of aorta    Benign non-nodular prostatic hyperplasia without lower urinary tract symptoms    Diverticulosis of colon    Elevated CPK Commonly known as:  CLARITIN           LORazepam 0.5 MG Tabs   TAKE 1 TABLET BY MOUTH NIGHTLY   Commonly known as:  ATIVAN           melatonin 3 MG Tabs   Take 5 mg by mouth nightly.            omega-3 fatty acids 1000 MG Caps   Take 1 capsule by mouth jacklyn

## (undated) NOTE — LETTER
No referring provider defined for this encounter. 05/04/20        Patient: Mikey Gonzalez   YOB: 1940   Date of Visit: 5/4/2020       Dear  Dr. Amanda Sands MD,      Thank you for referring Mikey Gonzalez to my practice.   Please find m

## (undated) NOTE — LETTER
[address]   Date:   2/2/2021     Name:   Susanne Gonzalez    YOB: 1940   MRN:   [id number]       WHERE IS YOUR PAIN NOW? Anil the areas on your body where you feel the described sensations.   Use the appropriate symbo

## (undated) NOTE — LETTER
[address]   [facility] [address], [address] [place] [address]  [address]  [phone]        Dear Murrel Apgar,      I had the pleasure of seeing your patient, Delia Hodgson on 2019. Below please find a summary of our visit.   If you have any con

## (undated) NOTE — LETTER
[address]   Date:   4/6/2021     Name:   Vinod Lyles    YOB: 1940   MRN:   [id number]       WHERE IS YOUR PAIN NOW? Anil the areas on your body where you feel the described sensations.   Use the appropriate symbo

## (undated) NOTE — LETTER
[address]   Date:   2/26/2021     Name:   Esteban Aranda    YOB: 1940   MRN:   [id number]       WHERE IS YOUR PAIN NOW? Anil the areas on your body where you feel the described sensations.   Use the appropriate symb

## (undated) NOTE — LETTER
Patient Name: Dustin Grant  : 1940  MRN: [id number]  Patient Address: [address] [place] [id number]      Coronavirus Disease 2019 (COVID-19)     [facility] is committed to the safety and well-being of our patients, members, e carefully. If your symptoms get worse, call your healthcare provider immediately. 3. Get rest and stay hydrated.    4. If you have a medical appointment, call the healthcare provider ahead of time and tell them that you have or may have COVID-19.  5. For m of fever-reducing medications; and  · Improvement in respiratory symptoms (e.g., cough, shortness of breath); and  · At least 10 days have passed since symptoms first appeared OR if asymptomatic patient or date of symptom onset is unclear then use 10 days donors must:    · Have had a confirmed diagnosis of COVID-19  · Be symptom-free for at least 14 days*    *Some people will be required to have a repeat COVID-19 test in order to be eligible to donate.  If you’re instructed by Khushi that a repeat test is r prevent PASC?   The best way to prevent the long-term symptoms of COVID-19 is by getting the COVID 19 vaccine as soon as it is available to you, wear a mask in public, avoid large gatherings, wash your hands frequently, and stay at least 6 feet away from ot

## (undated) NOTE — LETTER
AUTHORIZATION FOR SURGICAL OPERATION OR OTHER PROCEDURE    1. I hereby authorize  and Kasi Gonzales PA-C, and [facility] staff assigned to my case to perform the following operation and/or procedure at the [facility] site:    _______________________________________________________________________________________________    Right middle finger cortisone injection   _______________________________________________________________________________________________    2.  My physician has explained the nature and purpose of the operation or other procedure, possible alternative methods of treatment, the risks involved, and the possibility of complication to me.  I acknowledge that no guarantee has been made as to the result that may be obtained.  3.  I recognize that, during the course of this operation, or other procedure, unforseen conditions may necessitate additional or different procedure than those listed above.  I, therefore, further authorize and request that the above named physician, his/her physician assistants or designees perform such procedures as are, in his/her professional opinion, necessary and desirable.  4.  Any tissue or organs removed in the operation or other procedure may be disposed of by and at the discretion of the [facility] and [facility].  5.  I understand that in the event of a medical emergency, I will be transported by local paramedics to [facility] or other hospital emergency department.  6.  I certify that I have read and fully understand the above consent to operation and/or other procedure.    7.  I acknowledge that my physician has explained sedation/analgesia administration to me including the risks and benefits.  I consent to the administration of sedation/analgesia as may be necessary or desirable in the judgement of my physician.    Witness signature: ___________________________________________________ Date:   ______/______/_____                    Time:  ________ A.M.  P.M.       Patient Name:  ______________________________________________________  (please print)      Patient signature:  ___________________________________________________             Relationship to Patient:           []  Parent    Responsible person                          []  Spouse  In case of minor or                    [] Other  _____________   Incompetent name:  __________________________________________________                               (please print)      _____________      Responsible person  In case of minor or  Incompetent signature:  _______________________________________________    Statement of Physician  My signature below affirms that prior to the time of the procedure, I have explained to the patient and/or his/her guardian, the risks and benefits involved in the proposed treatment and any reasonable alternative to the proposed treatment.  I have also explained the risks and benefits involved in the refusal of the proposed treatment and have answered the patient's questions.                        Date:  ______/______/_______  Provider                      Signature:  __________________________________________________________       Time:  ___________ A.M    P.M.